# Patient Record
Sex: MALE | Race: BLACK OR AFRICAN AMERICAN | NOT HISPANIC OR LATINO | Employment: FULL TIME | URBAN - METROPOLITAN AREA
[De-identification: names, ages, dates, MRNs, and addresses within clinical notes are randomized per-mention and may not be internally consistent; named-entity substitution may affect disease eponyms.]

---

## 2023-06-30 ENCOUNTER — OFFICE VISIT (OUTPATIENT)
Dept: PRIMARY CARE | Facility: CLINIC | Age: 33
End: 2023-06-30
Payer: COMMERCIAL

## 2023-06-30 ENCOUNTER — LAB (OUTPATIENT)
Dept: LAB | Facility: LAB | Age: 33
End: 2023-06-30
Payer: COMMERCIAL

## 2023-06-30 VITALS
SYSTOLIC BLOOD PRESSURE: 124 MMHG | WEIGHT: 226 LBS | DIASTOLIC BLOOD PRESSURE: 80 MMHG | HEART RATE: 80 BPM | HEIGHT: 68 IN | BODY MASS INDEX: 34.25 KG/M2

## 2023-06-30 DIAGNOSIS — Z72.51 UNPROTECTED SEXUAL INTERCOURSE: ICD-10-CM

## 2023-06-30 DIAGNOSIS — L81.9 ATYPICAL PIGMENTED SKIN LESION: Primary | ICD-10-CM

## 2023-06-30 PROBLEM — A60.00 GENITAL HERPES: Status: ACTIVE | Noted: 2023-06-30

## 2023-06-30 PROBLEM — G47.33 OSA (OBSTRUCTIVE SLEEP APNEA): Status: ACTIVE | Noted: 2023-06-30

## 2023-06-30 PROBLEM — I86.1 LEFT VARICOCELE: Status: ACTIVE | Noted: 2023-06-30

## 2023-06-30 PROBLEM — F41.9 ANXIETY: Status: ACTIVE | Noted: 2023-06-30

## 2023-06-30 PROBLEM — N46.9 INFERTILITY MALE: Status: ACTIVE | Noted: 2023-06-30

## 2023-06-30 PROBLEM — J30.9 ALLERGIC RHINITIS: Status: ACTIVE | Noted: 2023-06-30

## 2023-06-30 LAB
HEPATITIS B VIRUS SURFACE AB (MIU/ML) IN SERUM: 496 MIU/ML
HEPATITIS C VIRUS AB PRESENCE IN SERUM: NONREACTIVE
HIV 1/ 2 AG/AB SCREEN: NONREACTIVE
SYPHILIS TOTAL AB: NONREACTIVE

## 2023-06-30 PROCEDURE — 1036F TOBACCO NON-USER: CPT | Performed by: NURSE PRACTITIONER

## 2023-06-30 PROCEDURE — 86803 HEPATITIS C AB TEST: CPT

## 2023-06-30 PROCEDURE — 86706 HEP B SURFACE ANTIBODY: CPT

## 2023-06-30 PROCEDURE — 36415 COLL VENOUS BLD VENIPUNCTURE: CPT

## 2023-06-30 PROCEDURE — 86780 TREPONEMA PALLIDUM: CPT

## 2023-06-30 PROCEDURE — 87389 HIV-1 AG W/HIV-1&-2 AB AG IA: CPT

## 2023-06-30 PROCEDURE — 99213 OFFICE O/P EST LOW 20 MIN: CPT | Performed by: NURSE PRACTITIONER

## 2023-06-30 NOTE — PATIENT INSTRUCTIONS
Thank you for seeing me today.  It was a pleasure to see you again!    #LESION/GROWTH ON PENIS  See dermatology for evaluation of bumps on penis     #UNPROTECTED SEX  Labs ordered    RTC FOR CPE

## 2023-06-30 NOTE — PROGRESS NOTES
"Russel Willis is a 32 y.o. male who presents today for STD TESTING     Chief Complaint   Patient presents with    Follow-up     Russel is today for STD testing. Pt reports he wants a \"physical\" with complete bloodwork however pt is scheduled for F/U with c/o issue.       Symptoms: BUMPS ON PENIS  Pt states 2 bumps just popped up about 2 weeks ago  They do not itch/hurt/change size    Symptom onset has been acute for a time period of 2 week(s).     Severity is described as NO SYMPTOMS     Course of her symptoms over time is acute.    Has taken: NOTHING      Pt was seen and dx'd with HSV 2 about 1.5 yrs ago and he states these bumps are not like those     Pt is currently sexually active with the same female partner     Review of Systems  All 13 systems were reviewed and are within normal limits except positive and pertinent negative responses which are noted below or in HPI.        Objective   Vitals:  /80   Pulse 80   Ht 1.727 m (5' 8\")   Wt 103 kg (226 lb)   BMI 34.36 kg/m²         Physical Exam  Genitourinary:            Assessment/Plan   Problem List Items Addressed This Visit    None  Visit Diagnoses       Atypical pigmented skin lesion    -  Primary    Relevant Orders    Referral to Dermatology    Unprotected sexual intercourse        Relevant Orders    Hepatitis B Surface Antibody    Hepatitis C Antibody    HIV 1/2 Antigen/Antibody Screen with Reflex to Confirmation    Syphilis Screen with Reflex            "

## 2023-12-27 ENCOUNTER — HOSPITAL ENCOUNTER (EMERGENCY)
Facility: HOSPITAL | Age: 33
Discharge: HOME | End: 2023-12-27
Payer: COMMERCIAL

## 2023-12-27 ENCOUNTER — APPOINTMENT (OUTPATIENT)
Dept: RADIOLOGY | Facility: HOSPITAL | Age: 33
End: 2023-12-27
Payer: COMMERCIAL

## 2023-12-27 VITALS
DIASTOLIC BLOOD PRESSURE: 98 MMHG | RESPIRATION RATE: 16 BRPM | WEIGHT: 216 LBS | SYSTOLIC BLOOD PRESSURE: 147 MMHG | HEIGHT: 68 IN | OXYGEN SATURATION: 99 % | BODY MASS INDEX: 32.74 KG/M2 | HEART RATE: 77 BPM | TEMPERATURE: 97.8 F

## 2023-12-27 DIAGNOSIS — S49.91XA ARM INJURY, RIGHT, INITIAL ENCOUNTER: Primary | ICD-10-CM

## 2023-12-27 PROCEDURE — 73080 X-RAY EXAM OF ELBOW: CPT | Mod: RIGHT SIDE | Performed by: RADIOLOGY

## 2023-12-27 PROCEDURE — 2500000004 HC RX 250 GENERAL PHARMACY W/ HCPCS (ALT 636 FOR OP/ED)

## 2023-12-27 PROCEDURE — 73090 X-RAY EXAM OF FOREARM: CPT | Mod: RIGHT SIDE | Performed by: RADIOLOGY

## 2023-12-27 PROCEDURE — 99284 EMERGENCY DEPT VISIT MOD MDM: CPT | Mod: 25

## 2023-12-27 PROCEDURE — 73090 X-RAY EXAM OF FOREARM: CPT | Mod: RT

## 2023-12-27 PROCEDURE — 73080 X-RAY EXAM OF ELBOW: CPT | Mod: RT

## 2023-12-27 PROCEDURE — 96372 THER/PROPH/DIAG INJ SC/IM: CPT

## 2023-12-27 RX ORDER — KETOROLAC TROMETHAMINE 30 MG/ML
30 INJECTION, SOLUTION INTRAMUSCULAR; INTRAVENOUS ONCE
Status: COMPLETED | OUTPATIENT
Start: 2023-12-27 | End: 2023-12-27

## 2023-12-27 RX ADMIN — KETOROLAC TROMETHAMINE 30 MG: 30 INJECTION, SOLUTION INTRAMUSCULAR; INTRAVENOUS at 12:19

## 2023-12-27 ASSESSMENT — COLUMBIA-SUICIDE SEVERITY RATING SCALE - C-SSRS
2. HAVE YOU ACTUALLY HAD ANY THOUGHTS OF KILLING YOURSELF?: NO
6. HAVE YOU EVER DONE ANYTHING, STARTED TO DO ANYTHING, OR PREPARED TO DO ANYTHING TO END YOUR LIFE?: NO
1. IN THE PAST MONTH, HAVE YOU WISHED YOU WERE DEAD OR WISHED YOU COULD GO TO SLEEP AND NOT WAKE UP?: NO

## 2023-12-27 ASSESSMENT — PAIN - FUNCTIONAL ASSESSMENT: PAIN_FUNCTIONAL_ASSESSMENT: 0-10

## 2023-12-27 ASSESSMENT — PAIN DESCRIPTION - ORIENTATION: ORIENTATION: RIGHT

## 2023-12-27 ASSESSMENT — PAIN DESCRIPTION - LOCATION: LOCATION: ARM

## 2023-12-27 ASSESSMENT — PAIN SCALES - GENERAL: PAINLEVEL_OUTOF10: 5 - MODERATE PAIN

## 2023-12-27 ASSESSMENT — PAIN DESCRIPTION - PAIN TYPE: TYPE: ACUTE PAIN

## 2023-12-27 NOTE — ED TRIAGE NOTES
Pt reports while boxing one week ago, he punched a heavy bag and felt the pain immediately. Reports no relief with otc medications, ice and or massage. Pt reports when he attempted to shut the car door, and made the pain significantly worse.

## 2023-12-27 NOTE — ED PROVIDER NOTES
Limitations to history: None  Independent Historians: Family  External Records Reviewed: HIE, OARRS, outpatient notes, inpatient notes, paper charts if needed    History of Present Illness:  Patient is a 33-year-old male presents to ED chief complaint of a right arm injury.  Patient reports that he was at the boxing gym when he hit the bag, and felt immediate pain of his right elbow, right forearm region.  Patient reports that the pain is like a dull ache ever since.  Patient denies any numbness and/or tingling.  Patient denies any shoulder pain.  Reports that he has no known medical history takes no known medications has no known allergies.  Patient is alert and oriented x 3 upon examination, in no apparent distress.     Denies HA, C/P, SOB, ABD pain, Nausea, Vomiting, Diarrhea, Weakness, Dizziness, Fever, Chills.    PMFSH:   As per HPI, otherwise nurses notes reviewed in EMR    Physical Exam:  Appearance: Alert, oriented x3, supine on exam table with head elevated, cooperative, in no acute distress. Well nourished & well hydrated.      Skin: Intact, dry skin, no lesions, rash, petechiae or purpura.     Eyes: PERRLA, EOMs intact, Conjunctiva pink with no redness or exudates. No scleral icterus.     Ears: Hearing grossly intact.      Nose: Nares patent, no epistaxis.     Mouth: Dentition without concerning abnormalities. no obstruction of posterior pharynx.     Neck: Supple, without meningismus. Trachea at midline.     Pulmonary: Clear bilaterally with good chest wall excursion. No rales, rhonchi or wheezing. No accessory muscle use or stridor. Talking in full sentences.     Cardiac: Normal S1, S2 without murmur, rub, gallop or extrasystole.     Abdomen: Soft, nontender to light and deep palpation to all quadrants, normoactive bowel sounds.  No palpable organomegaly.  No rebound or guarding.     Genitourinary: Physical exam deferred.     Musculoskeletal: Normal gait. Full range of motion to all extremities. Rest of  the exam reveals no pain on palpation, instability, or deformity. Pulses full and equal. No cyanosis or clubbing. capillary refill <2 seconds to all examined digits.     Neurological:  Cranial nerves II through XII are grossly intact, normal sensation, no weakness, no focal findings identified.      Psychiatric: Appropriate mood and affect.    Labs Reviewed - No data to display   XR forearm right 2 views   Final Result   1. No acute fracture or malalignment of the right elbow or the right   forearm.   2. Incidental small triceps insertional site enthesophyte on the   olecranon.        MACRO:   None.        Signed by: Claire Weber 12/27/2023 12:35 PM   Dictation workstation:   [id number]      XR elbow right 3+ views   Final Result   1. No acute fracture or malalignment of the right elbow or the right   forearm.   2. Incidental small triceps insertional site enthesophyte on the   olecranon.        MACRO:   None.        Signed by: Claire Weber 12/27/2023 12:35 PM   Dictation workstation:   [id number]                 Repeat Evaluation below    Summary:  Medical Decision Making:   Patient presented as described in HPI. Patient case including ROS, PE, and treatment and plan discussed with ED attending if attached as cosigner. Due to patients presentation orders completed include as documented.  Patient evaluated for complaints of right arm pain after a injury.  X-ray imaging revealed no acute fracture or malalignment of the right elbow or of the right forearm.  Incidental small triceps insertional site enthesophyte on the olecranon.  Patient was able to move extremity, reports it is a dull ache.  Patient was given dosage of Toradol while in ED for right upper extremity pain.  Patient will be referred to Dr. Tre Cheema.  Advised to follow-up within the next week to 2 weeks for further evaluation.  Patient offered a sling for comfort.  Patient aware to take Tylenol and or Motrin as needed for pain.  Patient  also aware to rest extremity, ice as needed, and refrain from any physical activity or boxing at this time.  Patient was advised to follow up with PCP or recommended provider in 2-3 days for another evaluation and exam. I advised patient/guardian to return or go to closest emergency room immediately if symptoms change, get worse, new symptoms develop prior to follow up. If there is no improvement in symptoms in the next 24 hours they are advised to return for further evaluation and exam. I also explained the plan and treatment course. Patient/guardian is in agreement with plan, treatment course, and follow up and states verbally that they will comply.    Tests/Medications/Escalations of Care considered but not given:    Patient care discussed with: N/A  Social Determinants affecting care: N/A    Final diagnosis and disposition as documented in impression    Homegoing. I discussed the differential; results and discharge plan with the patient and/or family/friend/caregiver if present.  I emphasized the importance of follow-up with the physician I referred them to in the timeframe recommended.  I explained reasons for the patient to return to the Emergency Department. They agreed that if they feel their condition is worsening or if they have any other concern they should call 911 immediately for further assistance. I gave the patient an opportunity to ask all questions they had and answered all of them accordingly. They understand return precautions and discharge instructions. The patient and/or family/friend/caregiver expressed understanding verbally and that they would comply.       Disposition:  Discharge       This note has been transcribed using voice recognition and may contain grammatical errors, misplaced words, incorrect words, incorrect phrases or other errors.     Batsheva Hale, APRN-CNP  12/27/23 [id number]

## 2024-01-15 ENCOUNTER — APPOINTMENT (OUTPATIENT)
Dept: ORTHOPEDIC SURGERY | Facility: CLINIC | Age: 34
End: 2024-01-15
Payer: COMMERCIAL

## 2024-08-09 ENCOUNTER — HOSPITAL ENCOUNTER (OUTPATIENT)
Facility: HOSPITAL | Age: 34
Setting detail: OBSERVATION
Discharge: HOME | End: 2024-08-09
Attending: INTERNAL MEDICINE | Admitting: INTERNAL MEDICINE
Payer: COMMERCIAL

## 2024-08-09 ENCOUNTER — APPOINTMENT (OUTPATIENT)
Dept: CARDIOLOGY | Facility: HOSPITAL | Age: 34
End: 2024-08-09
Payer: COMMERCIAL

## 2024-08-09 ENCOUNTER — APPOINTMENT (OUTPATIENT)
Dept: RADIOLOGY | Facility: HOSPITAL | Age: 34
End: 2024-08-09
Payer: COMMERCIAL

## 2024-08-09 DIAGNOSIS — K21.9 GASTROESOPHAGEAL REFLUX DISEASE WITHOUT ESOPHAGITIS: ICD-10-CM

## 2024-08-09 DIAGNOSIS — R10.9 ABDOMINAL PAIN, UNSPECIFIED ABDOMINAL LOCATION: ICD-10-CM

## 2024-08-09 DIAGNOSIS — H81.10 BENIGN PAROXYSMAL POSITIONAL VERTIGO, UNSPECIFIED LATERALITY: ICD-10-CM

## 2024-08-09 DIAGNOSIS — G43.009 MIGRAINE WITHOUT AURA AND WITHOUT STATUS MIGRAINOSUS, NOT INTRACTABLE: ICD-10-CM

## 2024-08-09 DIAGNOSIS — R42 VERTIGO: Primary | ICD-10-CM

## 2024-08-09 DIAGNOSIS — H81.23: ICD-10-CM

## 2024-08-09 LAB
ALBUMIN SERPL BCP-MCNC: 4.3 G/DL (ref 3.4–5)
ALP SERPL-CCNC: 50 U/L (ref 33–120)
ALT SERPL W P-5'-P-CCNC: 44 U/L (ref 10–52)
ANION GAP SERPL CALC-SCNC: 10 MMOL/L (ref 10–20)
APPEARANCE UR: CLEAR
AST SERPL W P-5'-P-CCNC: 22 U/L (ref 9–39)
BASOPHILS # BLD AUTO: 0.05 X10*3/UL (ref 0–0.1)
BASOPHILS NFR BLD AUTO: 0.7 %
BILIRUB SERPL-MCNC: 0.6 MG/DL (ref 0–1.2)
BILIRUB UR STRIP.AUTO-MCNC: NEGATIVE MG/DL
BUN SERPL-MCNC: 12 MG/DL (ref 6–23)
CALCIUM SERPL-MCNC: 9.6 MG/DL (ref 8.6–10.3)
CHLORIDE SERPL-SCNC: 102 MMOL/L (ref 98–107)
CO2 SERPL-SCNC: 32 MMOL/L (ref 21–32)
COLOR UR: ABNORMAL
CREAT SERPL-MCNC: 1.19 MG/DL (ref 0.5–1.3)
EGFRCR SERPLBLD CKD-EPI 2021: 84 ML/MIN/1.73M*2
EOSINOPHIL # BLD AUTO: 0.23 X10*3/UL (ref 0–0.7)
EOSINOPHIL NFR BLD AUTO: 3.4 %
ERYTHROCYTE [DISTWIDTH] IN BLOOD BY AUTOMATED COUNT: 11.7 % (ref 11.5–14.5)
GLUCOSE BLD MANUAL STRIP-MCNC: 101 MG/DL (ref 74–99)
GLUCOSE SERPL-MCNC: 103 MG/DL (ref 74–99)
GLUCOSE UR STRIP.AUTO-MCNC: NORMAL MG/DL
HCT VFR BLD AUTO: 42.5 % (ref 41–52)
HGB BLD-MCNC: 14.4 G/DL (ref 13.5–17.5)
IMM GRANULOCYTES # BLD AUTO: 0.03 X10*3/UL (ref 0–0.7)
IMM GRANULOCYTES NFR BLD AUTO: 0.4 % (ref 0–0.9)
KETONES UR STRIP.AUTO-MCNC: NEGATIVE MG/DL
LEUKOCYTE ESTERASE UR QL STRIP.AUTO: NEGATIVE
LYMPHOCYTES # BLD AUTO: 2.66 X10*3/UL (ref 1.2–4.8)
LYMPHOCYTES NFR BLD AUTO: 39.8 %
MAGNESIUM SERPL-MCNC: 1.76 MG/DL (ref 1.6–2.4)
MCH RBC QN AUTO: 30.5 PG (ref 26–34)
MCHC RBC AUTO-ENTMCNC: 33.9 G/DL (ref 32–36)
MCV RBC AUTO: 90 FL (ref 80–100)
MONOCYTES # BLD AUTO: 0.45 X10*3/UL (ref 0.1–1)
MONOCYTES NFR BLD AUTO: 6.7 %
MUCOUS THREADS #/AREA URNS AUTO: NORMAL /LPF
NEUTROPHILS # BLD AUTO: 3.26 X10*3/UL (ref 1.2–7.7)
NEUTROPHILS NFR BLD AUTO: 49 %
NITRITE UR QL STRIP.AUTO: NEGATIVE
NRBC BLD-RTO: 0 /100 WBCS (ref 0–0)
PH UR STRIP.AUTO: 6.5 [PH]
PLATELET # BLD AUTO: 250 X10*3/UL (ref 150–450)
POTASSIUM SERPL-SCNC: 3.6 MMOL/L (ref 3.5–5.3)
PROT SERPL-MCNC: 7.4 G/DL (ref 6.4–8.2)
PROT UR STRIP.AUTO-MCNC: NEGATIVE MG/DL
RBC # BLD AUTO: 4.72 X10*6/UL (ref 4.5–5.9)
RBC # UR STRIP.AUTO: ABNORMAL /UL
RBC #/AREA URNS AUTO: NORMAL /HPF
SODIUM SERPL-SCNC: 140 MMOL/L (ref 136–145)
SP GR UR STRIP.AUTO: 1.02
UROBILINOGEN UR STRIP.AUTO-MCNC: NORMAL MG/DL
WBC # BLD AUTO: 6.7 X10*3/UL (ref 4.4–11.3)
WBC #/AREA URNS AUTO: NORMAL /HPF

## 2024-08-09 PROCEDURE — 99285 EMERGENCY DEPT VISIT HI MDM: CPT | Mod: 25

## 2024-08-09 PROCEDURE — 82947 ASSAY GLUCOSE BLOOD QUANT: CPT

## 2024-08-09 PROCEDURE — 70450 CT HEAD/BRAIN W/O DYE: CPT | Mod: 59

## 2024-08-09 PROCEDURE — 85025 COMPLETE CBC W/AUTO DIFF WBC: CPT

## 2024-08-09 PROCEDURE — 2500000001 HC RX 250 WO HCPCS SELF ADMINISTERED DRUGS (ALT 637 FOR MEDICARE OP)

## 2024-08-09 PROCEDURE — 70450 CT HEAD/BRAIN W/O DYE: CPT | Performed by: RADIOLOGY

## 2024-08-09 PROCEDURE — 36415 COLL VENOUS BLD VENIPUNCTURE: CPT

## 2024-08-09 PROCEDURE — G0378 HOSPITAL OBSERVATION PER HR: HCPCS

## 2024-08-09 PROCEDURE — 96375 TX/PRO/DX INJ NEW DRUG ADDON: CPT

## 2024-08-09 PROCEDURE — 81001 URINALYSIS AUTO W/SCOPE: CPT

## 2024-08-09 PROCEDURE — 96361 HYDRATE IV INFUSION ADD-ON: CPT

## 2024-08-09 PROCEDURE — 2500000004 HC RX 250 GENERAL PHARMACY W/ HCPCS (ALT 636 FOR OP/ED)

## 2024-08-09 PROCEDURE — 83735 ASSAY OF MAGNESIUM: CPT

## 2024-08-09 PROCEDURE — 80053 COMPREHEN METABOLIC PANEL: CPT

## 2024-08-09 PROCEDURE — 93005 ELECTROCARDIOGRAM TRACING: CPT

## 2024-08-09 PROCEDURE — 96374 THER/PROPH/DIAG INJ IV PUSH: CPT

## 2024-08-09 RX ORDER — TALC
3 POWDER (GRAM) TOPICAL NIGHTLY PRN
Status: DISPENSED | OUTPATIENT
Start: 2024-08-09

## 2024-08-09 RX ORDER — ONDANSETRON HYDROCHLORIDE 2 MG/ML
4 INJECTION, SOLUTION INTRAVENOUS EVERY 8 HOURS PRN
Status: DISPENSED | OUTPATIENT
Start: 2024-08-09

## 2024-08-09 RX ORDER — POLYETHYLENE GLYCOL 3350 17 G/17G
17 POWDER, FOR SOLUTION ORAL DAILY PRN
Status: ACTIVE | OUTPATIENT
Start: 2024-08-09

## 2024-08-09 RX ORDER — HYDROXYZINE HYDROCHLORIDE 25 MG/1
25 TABLET, FILM COATED ORAL ONCE
Status: COMPLETED | OUTPATIENT
Start: 2024-08-09 | End: 2024-08-09

## 2024-08-09 RX ORDER — MECLIZINE HCL 12.5 MG 12.5 MG/1
25 TABLET ORAL 3 TIMES DAILY PRN
Status: DISCONTINUED | OUTPATIENT
Start: 2024-08-09 | End: 2024-08-11

## 2024-08-09 RX ORDER — FLUTICASONE PROPIONATE 50 MCG
2 SPRAY, SUSPENSION (ML) NASAL DAILY
Status: DISPENSED | OUTPATIENT
Start: 2024-08-10

## 2024-08-09 RX ORDER — DIAZEPAM 5 MG/ML
5 INJECTION, SOLUTION INTRAMUSCULAR; INTRAVENOUS ONCE
Status: COMPLETED | OUTPATIENT
Start: 2024-08-09 | End: 2024-08-09

## 2024-08-09 RX ORDER — PANTOPRAZOLE SODIUM 40 MG/1
40 TABLET, DELAYED RELEASE ORAL
Status: DISCONTINUED | OUTPATIENT
Start: 2024-08-10 | End: 2024-08-11

## 2024-08-09 RX ORDER — SODIUM CHLORIDE 9 MG/ML
100 INJECTION, SOLUTION INTRAVENOUS CONTINUOUS
Status: DISCONTINUED | OUTPATIENT
Start: 2024-08-09 | End: 2024-08-11

## 2024-08-09 RX ORDER — METOCLOPRAMIDE HYDROCHLORIDE 5 MG/ML
10 INJECTION INTRAMUSCULAR; INTRAVENOUS EVERY 6 HOURS PRN
Status: CANCELLED | OUTPATIENT
Start: 2024-08-09

## 2024-08-09 RX ORDER — KETOROLAC TROMETHAMINE 15 MG/ML
15 INJECTION, SOLUTION INTRAMUSCULAR; INTRAVENOUS EVERY 6 HOURS PRN
Status: ACTIVE | OUTPATIENT
Start: 2024-08-09 | End: 2024-08-14

## 2024-08-09 RX ORDER — CETIRIZINE HYDROCHLORIDE 10 MG/1
10 TABLET ORAL DAILY
Status: DISPENSED | OUTPATIENT
Start: 2024-08-10

## 2024-08-09 RX ORDER — ENOXAPARIN SODIUM 100 MG/ML
40 INJECTION SUBCUTANEOUS EVERY 24 HOURS
Status: DISPENSED | OUTPATIENT
Start: 2024-08-09

## 2024-08-09 RX ORDER — ONDANSETRON HYDROCHLORIDE 2 MG/ML
4 INJECTION, SOLUTION INTRAVENOUS ONCE
Status: COMPLETED | OUTPATIENT
Start: 2024-08-09 | End: 2024-08-09

## 2024-08-09 RX ORDER — METOCLOPRAMIDE HYDROCHLORIDE 5 MG/ML
10 INJECTION INTRAMUSCULAR; INTRAVENOUS EVERY 6 HOURS PRN
Status: DISCONTINUED | OUTPATIENT
Start: 2024-08-09 | End: 2024-08-11

## 2024-08-09 RX ORDER — METOCLOPRAMIDE 10 MG/1
10 TABLET ORAL EVERY 6 HOURS PRN
Status: CANCELLED | OUTPATIENT
Start: 2024-08-09

## 2024-08-09 RX ORDER — MECLIZINE HCL 12.5 MG 12.5 MG/1
25 TABLET ORAL ONCE
Status: COMPLETED | OUTPATIENT
Start: 2024-08-09 | End: 2024-08-09

## 2024-08-09 RX ORDER — MECLIZINE HCL 12.5 MG 12.5 MG/1
TABLET ORAL
Status: COMPLETED
Start: 2024-08-09 | End: 2024-08-09

## 2024-08-09 RX ADMIN — HYDROXYZINE HYDROCHLORIDE 25 MG: 25 TABLET ORAL at 16:36

## 2024-08-09 RX ADMIN — SODIUM CHLORIDE, POTASSIUM CHLORIDE, SODIUM LACTATE AND CALCIUM CHLORIDE 1000 ML: 600; 310; 30; 20 INJECTION, SOLUTION INTRAVENOUS at 14:45

## 2024-08-09 RX ADMIN — FLUTICASONE PROPIONATE 2 SPRAY: 50 SPRAY, METERED NASAL at 23:35

## 2024-08-09 RX ADMIN — METOCLOPRAMIDE HYDROCHLORIDE 10 MG: 5 INJECTION INTRAMUSCULAR; INTRAVENOUS at 23:18

## 2024-08-09 RX ADMIN — MECLIZINE HYDROCHLORIDE 25 MG: 12.5 TABLET ORAL at 22:55

## 2024-08-09 RX ADMIN — MECLIZINE HYDROCHLORIDE 25 MG: 12.5 TABLET ORAL at 14:41

## 2024-08-09 RX ADMIN — SODIUM CHLORIDE 100 ML/HR: 9 INJECTION, SOLUTION INTRAVENOUS at 23:13

## 2024-08-09 RX ADMIN — DIAZEPAM 5 MG: 5 INJECTION, SOLUTION INTRAMUSCULAR; INTRAVENOUS at 18:30

## 2024-08-09 RX ADMIN — ONDANSETRON 4 MG: 2 INJECTION INTRAMUSCULAR; INTRAVENOUS at 14:42

## 2024-08-09 RX ADMIN — CETIRIZINE HYDROCHLORIDE 10 MG: 10 TABLET, FILM COATED ORAL at 23:35

## 2024-08-09 SDOH — SOCIAL STABILITY: SOCIAL INSECURITY: DO YOU FEEL ANYONE HAS EXPLOITED OR TAKEN ADVANTAGE OF YOU FINANCIALLY OR OF YOUR PERSONAL PROPERTY?: NO

## 2024-08-09 SDOH — SOCIAL STABILITY: SOCIAL INSECURITY: ARE YOU OR HAVE YOU BEEN THREATENED OR ABUSED PHYSICALLY, EMOTIONALLY, OR SEXUALLY BY ANYONE?: NO

## 2024-08-09 SDOH — SOCIAL STABILITY: SOCIAL INSECURITY: DOES ANYONE TRY TO KEEP YOU FROM HAVING/CONTACTING OTHER FRIENDS OR DOING THINGS OUTSIDE YOUR HOME?: NO

## 2024-08-09 SDOH — SOCIAL STABILITY: SOCIAL INSECURITY: HAVE YOU HAD ANY THOUGHTS OF HARMING ANYONE ELSE?: NO

## 2024-08-09 SDOH — SOCIAL STABILITY: SOCIAL INSECURITY: ARE THERE ANY APPARENT SIGNS OF INJURIES/BEHAVIORS THAT COULD BE RELATED TO ABUSE/NEGLECT?: NO

## 2024-08-09 SDOH — SOCIAL STABILITY: SOCIAL INSECURITY: ABUSE: ADULT

## 2024-08-09 SDOH — SOCIAL STABILITY: SOCIAL INSECURITY: DO YOU FEEL UNSAFE GOING BACK TO THE PLACE WHERE YOU ARE LIVING?: NO

## 2024-08-09 SDOH — SOCIAL STABILITY: SOCIAL INSECURITY: HAVE YOU HAD THOUGHTS OF HARMING ANYONE ELSE?: NO

## 2024-08-09 SDOH — SOCIAL STABILITY: SOCIAL INSECURITY: HAS ANYONE EVER THREATENED TO HURT YOUR FAMILY OR YOUR PETS?: NO

## 2024-08-09 SDOH — SOCIAL STABILITY: SOCIAL INSECURITY: WERE YOU ABLE TO COMPLETE ALL THE BEHAVIORAL HEALTH SCREENINGS?: YES

## 2024-08-09 ASSESSMENT — COGNITIVE AND FUNCTIONAL STATUS - GENERAL
PATIENT BASELINE BEDBOUND: NO
DAILY ACTIVITIY SCORE: 24
MOBILITY SCORE: 24

## 2024-08-09 ASSESSMENT — PATIENT HEALTH QUESTIONNAIRE - PHQ9
SUM OF ALL RESPONSES TO PHQ9 QUESTIONS 1 & 2: 0
1. LITTLE INTEREST OR PLEASURE IN DOING THINGS: NOT AT ALL
2. FEELING DOWN, DEPRESSED OR HOPELESS: NOT AT ALL

## 2024-08-09 ASSESSMENT — LIFESTYLE VARIABLES
HOW OFTEN DO YOU HAVE A DRINK CONTAINING ALCOHOL: NEVER
HOW MANY STANDARD DRINKS CONTAINING ALCOHOL DO YOU HAVE ON A TYPICAL DAY: PATIENT DOES NOT DRINK
HOW OFTEN DO YOU HAVE 6 OR MORE DRINKS ON ONE OCCASION: NEVER
AUDIT-C TOTAL SCORE: 0
AUDIT-C TOTAL SCORE: 0
SKIP TO QUESTIONS 9-10: 1

## 2024-08-09 ASSESSMENT — ENCOUNTER SYMPTOMS
DIZZINESS: 1
HEMATOLOGIC/LYMPHATIC NEGATIVE: 1
CARDIOVASCULAR NEGATIVE: 1
CONSTITUTIONAL NEGATIVE: 1
EYES NEGATIVE: 1
MUSCULOSKELETAL NEGATIVE: 1
RESPIRATORY NEGATIVE: 1
PSYCHIATRIC NEGATIVE: 1
VOMITING: 1
ENDOCRINE NEGATIVE: 1
ALLERGIC/IMMUNOLOGIC NEGATIVE: 1
NAUSEA: 1

## 2024-08-09 ASSESSMENT — ACTIVITIES OF DAILY LIVING (ADL)
HEARING - LEFT EAR: FUNCTIONAL
GROOMING: INDEPENDENT
WALKS IN HOME: INDEPENDENT
LACK_OF_TRANSPORTATION: NO
DRESSING YOURSELF: INDEPENDENT
FEEDING YOURSELF: INDEPENDENT
HEARING - RIGHT EAR: FUNCTIONAL
TOILETING: INDEPENDENT
PATIENT'S MEMORY ADEQUATE TO SAFELY COMPLETE DAILY ACTIVITIES?: YES
BATHING: INDEPENDENT
ADEQUATE_TO_COMPLETE_ADL: YES
JUDGMENT_ADEQUATE_SAFELY_COMPLETE_DAILY_ACTIVITIES: YES

## 2024-08-09 ASSESSMENT — PAIN DESCRIPTION - FREQUENCY: FREQUENCY: CONSTANT/CONTINUOUS

## 2024-08-09 ASSESSMENT — PAIN - FUNCTIONAL ASSESSMENT
PAIN_FUNCTIONAL_ASSESSMENT: 0-10
PAIN_FUNCTIONAL_ASSESSMENT: 0-10

## 2024-08-09 ASSESSMENT — PAIN DESCRIPTION - LOCATION: LOCATION: HEAD

## 2024-08-09 ASSESSMENT — PAIN SCALES - GENERAL
PAINLEVEL_OUTOF10: 4
PAINLEVEL_OUTOF10: 0 - NO PAIN
PAINLEVEL_OUTOF10: 0 - NO PAIN

## 2024-08-09 ASSESSMENT — PAIN DESCRIPTION - PAIN TYPE: TYPE: ACUTE PAIN

## 2024-08-09 ASSESSMENT — PAIN DESCRIPTION - PROGRESSION: CLINICAL_PROGRESSION: GRADUALLY WORSENING

## 2024-08-09 ASSESSMENT — PAIN DESCRIPTION - DESCRIPTORS: DESCRIPTORS: ACHING

## 2024-08-09 ASSESSMENT — PAIN DESCRIPTION - ONSET: ONSET: PROGRESSIVE

## 2024-08-09 NOTE — ED PROVIDER NOTES
HPI   Chief Complaint   Patient presents with    Dizziness    Vomiting    Headache       Patient is a 33-year-old male with significant PMH of GERD, anxiety presents to the ED with cc of dizziness x 3 days.  Patient states he was at a concert 4 days ago and when he woke up he had vertigo symptoms.  Patient states vertigo is intermittent and describes as a room spinning off-balance sensation.  Patient states he has never been diagnosed with vertigo in the past.  Patient states vertigo is worse with head movement or ambulating.  Patient denies any vision changes.  Patient does have headache in bilateral temples.  Patient does have nausea and vomiting endorses 2 episodes of emesis today and 1 episode yesterday.  Patient has been able to tolerate some p.o. intake.  Patient denies any chest pain shortness of breath abdominal pain diarrhea constipation.  Patient does endorse tingling throughout his entire body.  Patient denies any tobacco alcohol or street drug abuse.              Patient History   Past Medical History:   Diagnosis Date    Anesthesia of skin 06/25/2019    Numbness and tingling    Disorder of penis, unspecified 03/02/2021    Penile lesion    Dysphagia, oropharyngeal phase 09/23/2019    Dysphagia, oropharyngeal phase    Gastro-esophageal reflux disease without esophagitis 11/11/2019    GERD without esophagitis    Localized swelling, mass and lump, neck 08/27/2019    Mass of right side of neck    Other specified anxiety disorders 06/19/2019    Situational anxiety    Otitis media, unspecified, left ear 06/18/2020    Acute left otitis media    Personal history of other diseases of the digestive system 01/12/2021    History of gastroesophageal reflux (GERD)    Personal history of other diseases of the nervous system and sense organs 06/18/2020    History of otitis media    Personal history of other diseases of the nervous system and sense organs 10/10/2019    History of sleep apnea    Personal history of other  diseases of the respiratory system 06/04/2015    History of asthma    Personal history of other diseases of the respiratory system 08/27/2019    History of acute bronchitis    Personal history of other infectious and parasitic diseases 10/16/2019    History of Helicobacter pylori infection    Personal history of other specified conditions 11/09/2020    History of nasal congestion    Personal history of other specified conditions 09/23/2019    History of dysphagia    Personal history of other specified conditions 11/09/2020    History of nasal congestion    Personal history of other specified conditions 06/19/2019    History of shortness of breath    Personal history of other specified conditions 11/11/2019    History of palpitations    Personal history of other specified conditions 09/23/2019    History of chest pain    Shortness of breath 06/17/2019    Exertional shortness of breath    Unspecified otitis externa, left ear 06/18/2020    Left otitis externa     History reviewed. No pertinent surgical history.  Family History   Problem Relation Name Age of Onset    Osteoarthritis Mother      Other (t2dm) Father       Social History     Tobacco Use    Smoking status: Never     Passive exposure: Current    Smokeless tobacco: Current   Vaping Use    Vaping status: Every Day    Substances: Nicotine, Flavoring   Substance Use Topics    Alcohol use: Yes     Comment: social    Drug use: Never       Physical Exam   ED Triage Vitals [08/09/24 1423]   Temperature Heart Rate Respirations BP   36.8 °C (98.2 °F) 67 18 142/86      Pulse Ox Temp Source Heart Rate Source Patient Position   97 % Oral -- --      BP Location FiO2 (%)     -- --       Physical Exam  HENT:      Head: Normocephalic.   Eyes:      Extraocular Movements: Extraocular movements intact.      Pupils: Pupils are equal, round, and reactive to light.   Cardiovascular:      Rate and Rhythm: Normal rate and regular rhythm.      Heart sounds: Normal heart sounds.    Pulmonary:      Effort: Pulmonary effort is normal.      Breath sounds: No wheezing, rhonchi or rales.   Abdominal:      Palpations: Abdomen is soft.      Tenderness: There is no abdominal tenderness. There is no guarding.   Musculoskeletal:         General: Normal range of motion.      Cervical back: Normal range of motion.   Neurological:      Mental Status: He is alert and oriented to person, place, and time. Mental status is at baseline.      GCS: GCS eye subscore is 4. GCS verbal subscore is 5. GCS motor subscore is 6.      Cranial Nerves: No dysarthria or facial asymmetry.      Sensory: No sensory deficit.      Motor: No weakness.      Gait: Gait normal.   Psychiatric:         Mood and Affect: Mood normal.           ED Course & MDM   ED Course as of 08/09/24 1946   Fri Aug 09, 2024   1455 EKG interpretation performed at 1490 normal sinus rhythm normal axis no acute signs of ischemia ventricular rate 66 bpm []      ED Course User Index  [] Ely Fung PA-C         Diagnoses as of 08/09/24 1946   Vertigo                 No data recorded     Hansa Coma Scale Score: 15 (08/09/24 1425 : Buck Bruce, EMT)                           Medical Decision Making  Medical Decision Making:  Patient presented as described in HPI. Patient case including ROS, PE, and treatment and plan discussed with ED attending if attached as cosigner. Due to patients presentation orders completed include as documented.  Patient presents to the ED with cc of dizziness x 3 days.  Patient states 4 days ago he was at a concert and has had vertigo symptoms since.  Patient states it is intermittent and worse with movement.  Patient does have some vomiting.  Patient is nontoxic-appearing abdomen is soft and nontender no neurofocal deficits lung sounds are clear bilaterally PERRLA EOMI. patient fluids Zofran and meclizine for symptoms.  Pending labs.  Labs are unremarkable.  Imaging is unremarkable.  Patient was given Atarax with  no relief in symptoms.  Patient given Valium again no relief in symptoms.  Patient offered admission which he would prefer due to continued symptoms.  I spoke with the hospitalist who accepts the patient.  Patient remained stable and admitted.        Patient care discussed with: N/A  Social Determinants affecting care: N/A    Final diagnosis and disposition as below.  See CI    Hospitalize. I discussed the differential; results and admit plan with the patient and/or family/friend/caregiver if present.  I emphasized the importance of hospitalization need for re-evaluation/continued monitoring/interventions.. They agreed that if they feel their condition is worsening or if they have any other concern they should alert staff immediately for further assistance. I gave the patient an opportunity to ask all questions they had and answered all of them accordingly. The patient and/or family/friend/caregiver expressed understanding verbally and that they would comply.       Disposition:  admit    Hospitalize. Discussed findings and treatment done here in ED with admitting physician. It would be a risk to discharge the patient in their condition due to possibility of deterioration in their condition and the need for urgent interventions.    This note has been transcribed using voice recognition and may contain grammatical errors, misplaced words, incorrect words, incorrect phrases or other errors.        CT head wo IV contrast   Final Result   No acute intracranial process.                  MACRO:   None.        Signed by: Mik Maya 8/9/2024 4:24 PM   Dictation workstation:   [id number]         Labs Reviewed   COMPREHENSIVE METABOLIC PANEL - Abnormal       Result Value    Glucose 103 (*)     Sodium 140      Potassium 3.6      Chloride 102      Bicarbonate 32      Anion Gap 10      Urea Nitrogen 12      Creatinine 1.19      eGFR 84      Calcium 9.6      Albumin 4.3      Alkaline Phosphatase 50      Total Protein 7.4       AST 22      Bilirubin, Total 0.6      ALT 44     URINALYSIS WITH REFLEX CULTURE AND MICROSCOPIC - Abnormal    Color, Urine Light-Yellow      Appearance, Urine Clear      Specific Gravity, Urine 1.022      pH, Urine 6.5      Protein, Urine NEGATIVE      Glucose, Urine Normal      Blood, Urine 0.03 (TRACE) (*)     Ketones, Urine NEGATIVE      Bilirubin, Urine NEGATIVE      Urobilinogen, Urine Normal      Nitrite, Urine NEGATIVE      Leukocyte Esterase, Urine NEGATIVE     POCT GLUCOSE - Abnormal    POCT Glucose 101 (*)    MAGNESIUM - Normal    Magnesium 1.76     CBC WITH AUTO DIFFERENTIAL    WBC 6.7      nRBC 0.0      RBC 4.72      Hemoglobin 14.4      Hematocrit 42.5      MCV 90      MCH 30.5      MCHC 33.9      RDW 11.7      Platelets 250      Neutrophils % 49.0      Immature Granulocytes %, Automated 0.4      Lymphocytes % 39.8      Monocytes % 6.7      Eosinophils % 3.4      Basophils % 0.7      Neutrophils Absolute 3.26      Immature Granulocytes Absolute, Automated 0.03      Lymphocytes Absolute 2.66      Monocytes Absolute 0.45      Eosinophils Absolute 0.23      Basophils Absolute 0.05     URINALYSIS WITH REFLEX CULTURE AND MICROSCOPIC    Narrative:     The following orders were created for panel order Urinalysis with Reflex Culture and Microscopic.  Procedure                               Abnormality         Status                     ---------                               -----------         ------                     Urinalysis with Reflex C...[[id number]]  Abnormal            Final result               Extra Urine Gray Tube[[id number]]                            In process                   Please view results for these tests on the individual orders.   EXTRA URINE GRAY TUBE   POCT GLUCOSE METER   URINALYSIS MICROSCOPIC WITH REFLEX CULTURE    WBC, Urine 1-5      RBC, Urine 1-2      Mucus, Urine FEW        Procedure  Procedures     Ely Fung PA-C  08/09/24 1947

## 2024-08-09 NOTE — LETTER
August 10, 2024     Patient: Russel Willis   YOB: 1990   Date of Visit: 8/9/2024       To Whom It May Concern:    Russel Willis was admitted on 8/9/2024 and discharged on 8/10/2024 . Please excuse Russel for his absence from work. He may return to work on Monday, 8/12/2024 with no restrictions.    If you have any questions or concerns, please don't hesitate to call.         Sincerely,         VAL King-ANGIE  Hospitalist        CC: No Recipients

## 2024-08-09 NOTE — LETTER
August 13, 2024     Patient: Russel Willis   YOB: 1990   Date of Visit: 8/9/2024       To Whom It May Concern:    Russel Willis was admitted under my care from 8/9/24 through 8/13/24. He may return to work on 8/15/24.  If you have any questions or concerns, please don't hesitate to call.     Sincerely,     Neeta Cardona, VAL-CNP  08/13/24  4:08 PM

## 2024-08-10 ENCOUNTER — APPOINTMENT (OUTPATIENT)
Dept: RADIOLOGY | Facility: HOSPITAL | Age: 34
End: 2024-08-10
Payer: COMMERCIAL

## 2024-08-10 PROBLEM — J01.90 ACUTE SINUSITIS: Status: ACTIVE | Noted: 2024-08-10

## 2024-08-10 PROBLEM — J20.9 ACUTE BRONCHITIS: Status: ACTIVE | Noted: 2024-08-10

## 2024-08-10 LAB
AMYLASE SERPL-CCNC: 48 U/L (ref 29–103)
ANION GAP SERPL CALC-SCNC: 10 MMOL/L (ref 10–20)
BUN SERPL-MCNC: 12 MG/DL (ref 6–23)
CALCIUM SERPL-MCNC: 9 MG/DL (ref 8.6–10.3)
CHLORIDE SERPL-SCNC: 102 MMOL/L (ref 98–107)
CO2 SERPL-SCNC: 31 MMOL/L (ref 21–32)
CREAT SERPL-MCNC: 1.03 MG/DL (ref 0.5–1.3)
EGFRCR SERPLBLD CKD-EPI 2021: >90 ML/MIN/1.73M*2
ERYTHROCYTE [DISTWIDTH] IN BLOOD BY AUTOMATED COUNT: 11.8 % (ref 11.5–14.5)
GLUCOSE SERPL-MCNC: 104 MG/DL (ref 74–99)
HCT VFR BLD AUTO: 40.8 % (ref 41–52)
HGB BLD-MCNC: 14 G/DL (ref 13.5–17.5)
HOLD SPECIMEN: NORMAL
LIPASE SERPL-CCNC: 14 U/L (ref 9–82)
MCH RBC QN AUTO: 30.9 PG (ref 26–34)
MCHC RBC AUTO-ENTMCNC: 34.3 G/DL (ref 32–36)
MCV RBC AUTO: 90 FL (ref 80–100)
NRBC BLD-RTO: 0 /100 WBCS (ref 0–0)
PLATELET # BLD AUTO: 238 X10*3/UL (ref 150–450)
POTASSIUM SERPL-SCNC: 4.1 MMOL/L (ref 3.5–5.3)
RBC # BLD AUTO: 4.53 X10*6/UL (ref 4.5–5.9)
SODIUM SERPL-SCNC: 139 MMOL/L (ref 136–145)
WBC # BLD AUTO: 8.1 X10*3/UL (ref 4.4–11.3)

## 2024-08-10 PROCEDURE — 80048 BASIC METABOLIC PNL TOTAL CA: CPT

## 2024-08-10 PROCEDURE — 99222 1ST HOSP IP/OBS MODERATE 55: CPT | Performed by: NURSE PRACTITIONER

## 2024-08-10 PROCEDURE — 83690 ASSAY OF LIPASE: CPT | Performed by: NURSE PRACTITIONER

## 2024-08-10 PROCEDURE — 74177 CT ABD & PELVIS W/CONTRAST: CPT | Performed by: RADIOLOGY

## 2024-08-10 PROCEDURE — 82150 ASSAY OF AMYLASE: CPT | Performed by: NURSE PRACTITIONER

## 2024-08-10 PROCEDURE — 2500000001 HC RX 250 WO HCPCS SELF ADMINISTERED DRUGS (ALT 637 FOR MEDICARE OP): Performed by: NURSE PRACTITIONER

## 2024-08-10 PROCEDURE — 94760 N-INVAS EAR/PLS OXIMETRY 1: CPT

## 2024-08-10 PROCEDURE — 2500000002 HC RX 250 W HCPCS SELF ADMINISTERED DRUGS (ALT 637 FOR MEDICARE OP, ALT 636 FOR OP/ED)

## 2024-08-10 PROCEDURE — G0378 HOSPITAL OBSERVATION PER HR: HCPCS

## 2024-08-10 PROCEDURE — 2500000001 HC RX 250 WO HCPCS SELF ADMINISTERED DRUGS (ALT 637 FOR MEDICARE OP)

## 2024-08-10 PROCEDURE — 96372 THER/PROPH/DIAG INJ SC/IM: CPT

## 2024-08-10 PROCEDURE — 74177 CT ABD & PELVIS W/CONTRAST: CPT

## 2024-08-10 PROCEDURE — 2500000004 HC RX 250 GENERAL PHARMACY W/ HCPCS (ALT 636 FOR OP/ED)

## 2024-08-10 PROCEDURE — 96376 TX/PRO/DX INJ SAME DRUG ADON: CPT

## 2024-08-10 PROCEDURE — 2550000001 HC RX 255 CONTRASTS: Performed by: INTERNAL MEDICINE

## 2024-08-10 PROCEDURE — 85027 COMPLETE CBC AUTOMATED: CPT

## 2024-08-10 PROCEDURE — 36415 COLL VENOUS BLD VENIPUNCTURE: CPT

## 2024-08-10 RX ORDER — IBUPROFEN 400 MG/1
400 TABLET ORAL ONCE
Status: COMPLETED | OUTPATIENT
Start: 2024-08-10 | End: 2024-08-10

## 2024-08-10 RX ORDER — MECLIZINE HYDROCHLORIDE 25 MG/1
25 TABLET ORAL 3 TIMES DAILY PRN
Qty: 42 TABLET | Refills: 0 | Status: SHIPPED | OUTPATIENT
Start: 2024-08-10 | End: 2024-08-24

## 2024-08-10 RX ORDER — ALUMINUM HYDROXIDE, MAGNESIUM HYDROXIDE, AND SIMETHICONE 1200; 120; 1200 MG/30ML; MG/30ML; MG/30ML
10 SUSPENSION ORAL 4 TIMES DAILY PRN
Status: ACTIVE | OUTPATIENT
Start: 2024-08-10

## 2024-08-10 RX ORDER — CALCIUM CARBONATE 200(500)MG
500 TABLET,CHEWABLE ORAL 4 TIMES DAILY PRN
Status: DISPENSED | OUTPATIENT
Start: 2024-08-10

## 2024-08-10 RX ORDER — SODIUM CHLORIDE 9 MG/ML
10 INJECTION, SOLUTION INTRAVENOUS CONTINUOUS PRN
Status: ACTIVE | OUTPATIENT
Start: 2024-08-10

## 2024-08-10 RX ORDER — BUTALBITAL, ACETAMINOPHEN AND CAFFEINE 50; 325; 40 MG/1; MG/1; MG/1
1 TABLET ORAL EVERY 4 HOURS PRN
Status: DISCONTINUED | OUTPATIENT
Start: 2024-08-10 | End: 2024-08-11

## 2024-08-10 RX ADMIN — BUTALBITAL, ACETAMINOPHEN, AND CAFFEINE 1 TABLET: 325; 50; 40 TABLET ORAL at 09:12

## 2024-08-10 RX ADMIN — BUTALBITAL, ACETAMINOPHEN, AND CAFFEINE 1 TABLET: 325; 50; 40 TABLET ORAL at 21:46

## 2024-08-10 RX ADMIN — IOHEXOL 75 ML: 350 INJECTION, SOLUTION INTRAVENOUS at 14:46

## 2024-08-10 RX ADMIN — IBUPROFEN 400 MG: 400 TABLET ORAL at 17:52

## 2024-08-10 RX ADMIN — ONDANSETRON 4 MG: 2 INJECTION INTRAMUSCULAR; INTRAVENOUS at 11:32

## 2024-08-10 RX ADMIN — CALCIUM CARBONATE (ANTACID) CHEW TAB 500 MG 500 MG: 500 CHEW TAB at 01:19

## 2024-08-10 RX ADMIN — PANTOPRAZOLE SODIUM 40 MG: 40 TABLET, DELAYED RELEASE ORAL at 06:09

## 2024-08-10 RX ADMIN — Medication 3 MG: at 21:46

## 2024-08-10 RX ADMIN — ONDANSETRON 4 MG: 2 INJECTION INTRAMUSCULAR; INTRAVENOUS at 07:00

## 2024-08-10 RX ADMIN — ENOXAPARIN SODIUM 40 MG: 40 INJECTION SUBCUTANEOUS at 23:03

## 2024-08-10 ASSESSMENT — PAIN - FUNCTIONAL ASSESSMENT
PAIN_FUNCTIONAL_ASSESSMENT: WONG-BAKER FACES
PAIN_FUNCTIONAL_ASSESSMENT: 0-10

## 2024-08-10 ASSESSMENT — COGNITIVE AND FUNCTIONAL STATUS - GENERAL
DAILY ACTIVITIY SCORE: 24
MOBILITY SCORE: 24

## 2024-08-10 ASSESSMENT — PAIN SCALES - GENERAL
PAINLEVEL_OUTOF10: 0 - NO PAIN
PAINLEVEL_OUTOF10: 0 - NO PAIN
PAINLEVEL_OUTOF10: 6
PAINLEVEL_OUTOF10: 3
PAINLEVEL_OUTOF10: 0 - NO PAIN
PAINLEVEL_OUTOF10: 0 - NO PAIN

## 2024-08-10 ASSESSMENT — PAIN SCALES - WONG BAKER: WONGBAKER_NUMERICALRESPONSE: NO HURT

## 2024-08-10 ASSESSMENT — PAIN DESCRIPTION - DESCRIPTORS: DESCRIPTORS: ACHING;DISCOMFORT

## 2024-08-10 NOTE — DISCHARGE SUMMARY
Discharge Diagnosis  Vertigo  Migraine headache without aura  BPPV    Issues Requiring Follow-Up      Discharge Meds     Your medication list        START taking these medications        Instructions Last Dose Given Next Dose Due   meclizine 25 mg tablet  Commonly known as: Antivert      Take 1 tablet (25 mg) by mouth 3 times a day as needed for dizziness for up to 14 days.                 Where to Get Your Medications        These medications were sent to [id number] - [place], [place] - [address]  [address] [place] [address]      Phone: [phone]   meclizine 25 mg tablet         Test Results Pending At Discharge  Pending Labs       No current pending labs.         33 y.o. male presenting with dizziness x 3 days. Patient states he was at a concert 4 days ago and when he woke up he had vertigo symptoms. Patient states vertigo is intermittent and describes as a room spinning off-balance sensation. Patient states he has never been diagnosed with vertigo in the past. Patient states vertigo is worse with head movement or ambulating. Patient denies any vision changes. Patient does have headache in bilateral temples. Patient does have nausea and vomiting endorses 2 episodes of emesis today and 1 episode yesterday. Patient has been able to tolerate some p.o. intake. Patient denies any chest pain shortness of breath abdominal pain diarrhea constipation. Patient does endorse tingling throughout his entire body. Patient denies any tobacco alcohol or street drug abuse.  Patient currently resting in bed, does endorse continued vertigo worse with movement.  Discussed treatment plan, patient states understanding, and agrees with plan.     Hospital Course    Vertigo  Migraine without aura  BPPV  -CT Brain: No acute intracranial process.  -Valium 5 mg IV, Atarax 25 mg PO, Antivert 25 mg PO, Zofran 4 mg IV, and LR 1,000 ml IV given in ED with no effect.  -Reglan, Fiorcet 50 -325 mg q4h, prn  Antivert, Toradol prn.  -Eply maneuver done by Dr. Carnes with some improvement  -outpatient vestibular therapy     Allergic rhinitis  -Zyrtec 10 mg PO Daily   -Flonase Nasal Daily      GERD  -Pantoprazole 40 mg PO Daily         DVT Prophylaxis:   - enoxaparin       Code Status: Full Code    Disposition: Patient was stable to be discharged to home. He will follow up with PT for vestibular therapy. He was discharged on meclizine. He will follow up with his PCP.    Total cumulative time spent in preparation of this discharge including documentation review, coordination of care with the medical team including PT/SW/care coordinators and treating consultants, discussion with patient and pertinent family members and finalization of prescriptions, follow-up appointments, and this discharge summary was approximately 45 minutes.     Seen with and case discussed with Dr. Tanner MD     Pertinent Physical Exam At Time of Discharge  Physical Exam  Constitutional:       Appearance: He is obese.   HENT:      Head: Normocephalic and atraumatic.      Nose: Nose normal.      Mouth/Throat:      Mouth: Mucous membranes are moist.   Eyes:      Extraocular Movements: Extraocular movements intact.      Pupils: Pupils are equal, round, and reactive to light.   Cardiovascular:      Rate and Rhythm: Normal rate and regular rhythm.      Pulses: Normal pulses.      Heart sounds: Normal heart sounds.   Pulmonary:      Effort: Pulmonary effort is normal.      Breath sounds: Normal breath sounds.   Abdominal:      General: Bowel sounds are normal.      Palpations: Abdomen is soft.   Musculoskeletal:         General: Normal range of motion.      Cervical back: Normal range of motion.   Skin:     General: Skin is warm and dry.      Capillary Refill: Capillary refill takes less than 2 seconds.   Neurological:      Mental Status: He is alert and oriented to person, place, and time. Mental status is at baseline.   Psychiatric:         Mood and  Affect: Mood normal.         Behavior: Behavior normal.   Outpatient Follow-Up  No future appointments.      Katelynn Rose, APRN-CNP

## 2024-08-10 NOTE — PROGRESS NOTES
Russel Willis is a 33 y.o. male on day 0 of admission presenting with Vertigo.      Subjective   Patient assessed at bedside; lying in bed. He complained of a headache and dizziness. He vomited after eating breakfast. He denies chest pain fever, chills.       Objective     Last Recorded Vitals  /71 (BP Location: Left arm, Patient Position: Sitting)   Pulse 72   Temp 36 °C (96.8 °F) (Temporal)   Resp 16   Wt 110 kg (241 lb 10 oz)   SpO2 96%   Intake/Output last 3 Shifts:    Intake/Output Summary (Last 24 hours) at 8/10/2024 1401  Last data filed at 8/10/2024 1300  Gross per 24 hour   Intake 3270.34 ml   Output 1000 ml   Net 2270.34 ml       Admission Weight  Weight: 107 kg (235 lb) (08/09/24 1423)    Daily Weight  08/10/24 : 110 kg (241 lb 10 oz)    Image Results      Physical Exam  Vitals reviewed.   Constitutional:       Appearance: Normal appearance. He is obese.   HENT:      Head: Normocephalic and atraumatic.      Right Ear: External ear normal.      Left Ear: External ear normal.      Nose: Nose normal.      Mouth/Throat:      Mouth: Mucous membranes are moist.      Pharynx: Oropharynx is clear.   Eyes:      Conjunctiva/sclera: Conjunctivae normal.      Pupils: Pupils are equal, round, and reactive to light.   Cardiovascular:      Rate and Rhythm: Normal rate and regular rhythm.      Pulses: Normal pulses.      Heart sounds: Normal heart sounds.   Pulmonary:      Effort: Pulmonary effort is normal.      Breath sounds: Normal breath sounds.   Abdominal:      General: Bowel sounds are normal.      Palpations: Abdomen is soft.   Musculoskeletal:         General: Normal range of motion.      Cervical back: Normal range of motion and neck supple.   Skin:     General: Skin is warm and dry.   Neurological:      General: No focal deficit present.      Mental Status: He is alert and oriented to person, place, and time.   Psychiatric:         Mood and Affect: Mood normal.         Behavior: Behavior  normal.         Thought Content: Thought content normal.         Judgment: Judgment normal.         Relevant Results    Scheduled medications  cetirizine, 10 mg, oral, Daily  enoxaparin, 40 mg, subcutaneous, q24h  fluticasone, 2 spray, Each Nostril, Daily  pantoprazole, 40 mg, oral, Daily before breakfast      Continuous medications  sodium chloride 0.9%, 100 mL/hr, Last Rate: Stopped (08/10/24 0900)  sodium chloride 0.9%, 10 mL/hr      PRN medications  PRN medications: acetaminophen-caffeine, alum-mag hydroxide-simeth, butalbital-acetaminophen-caff, calcium carbonate, ketorolac, meclizine, melatonin, metoclopramide, ondansetron, polyethylene glycol, sodium chloride 0.9%    Results for orders placed or performed during the hospital encounter of 08/09/24 (from the past 24 hour(s))   POCT GLUCOSE   Result Value Ref Range    POCT Glucose 101 (H) 74 - 99 mg/dL   CBC and Auto Differential   Result Value Ref Range    WBC 6.7 4.4 - 11.3 x10*3/uL    nRBC 0.0 0.0 - 0.0 /100 WBCs    RBC 4.72 4.50 - 5.90 x10*6/uL    Hemoglobin 14.4 13.5 - 17.5 g/dL    Hematocrit 42.5 41.0 - 52.0 %    MCV 90 80 - 100 fL    MCH 30.5 26.0 - 34.0 pg    MCHC 33.9 32.0 - 36.0 g/dL    RDW 11.7 11.5 - 14.5 %    Platelets 250 150 - 450 x10*3/uL    Neutrophils % 49.0 40.0 - 80.0 %    Immature Granulocytes %, Automated 0.4 0.0 - 0.9 %    Lymphocytes % 39.8 13.0 - 44.0 %    Monocytes % 6.7 2.0 - 10.0 %    Eosinophils % 3.4 0.0 - 6.0 %    Basophils % 0.7 0.0 - 2.0 %    Neutrophils Absolute 3.26 1.20 - 7.70 x10*3/uL    Immature Granulocytes Absolute, Automated 0.03 0.00 - 0.70 x10*3/uL    Lymphocytes Absolute 2.66 1.20 - 4.80 x10*3/uL    Monocytes Absolute 0.45 0.10 - 1.00 x10*3/uL    Eosinophils Absolute 0.23 0.00 - 0.70 x10*3/uL    Basophils Absolute 0.05 0.00 - 0.10 x10*3/uL   Comprehensive metabolic panel   Result Value Ref Range    Glucose 103 (H) 74 - 99 mg/dL    Sodium 140 136 - 145 mmol/L    Potassium 3.6 3.5 - 5.3 mmol/L    Chloride 102 98 - 107  mmol/L    Bicarbonate 32 21 - 32 mmol/L    Anion Gap 10 10 - 20 mmol/L    Urea Nitrogen 12 6 - 23 mg/dL    Creatinine 1.19 0.50 - 1.30 mg/dL    eGFR 84 >60 mL/min/1.73m*2    Calcium 9.6 8.6 - 10.3 mg/dL    Albumin 4.3 3.4 - 5.0 g/dL    Alkaline Phosphatase 50 33 - 120 U/L    Total Protein 7.4 6.4 - 8.2 g/dL    AST 22 9 - 39 U/L    Bilirubin, Total 0.6 0.0 - 1.2 mg/dL    ALT 44 10 - 52 U/L   Magnesium   Result Value Ref Range    Magnesium 1.76 1.60 - 2.40 mg/dL   Urinalysis with Reflex Culture and Microscopic   Result Value Ref Range    Color, Urine Light-Yellow Light-Yellow, Yellow, Dark-Yellow    Appearance, Urine Clear Clear    Specific Gravity, Urine 1.022 1.005 - 1.035    pH, Urine 6.5 5.0, 5.5, 6.0, 6.5, 7.0, 7.5, 8.0    Protein, Urine NEGATIVE NEGATIVE, 10 (TRACE), 20 (TRACE) mg/dL    Glucose, Urine Normal Normal mg/dL    Blood, Urine 0.03 (TRACE) (A) NEGATIVE    Ketones, Urine NEGATIVE NEGATIVE mg/dL    Bilirubin, Urine NEGATIVE NEGATIVE    Urobilinogen, Urine Normal Normal mg/dL    Nitrite, Urine NEGATIVE NEGATIVE    Leukocyte Esterase, Urine NEGATIVE NEGATIVE   Extra Urine Gray Tube   Result Value Ref Range    Extra Tube Hold for add-ons.    Urinalysis Microscopic   Result Value Ref Range    WBC, Urine 1-5 1-5, NONE /HPF    RBC, Urine 1-2 NONE, 1-2, 3-5 /HPF    Mucus, Urine FEW Reference range not established. /LPF   CBC   Result Value Ref Range    WBC 8.1 4.4 - 11.3 x10*3/uL    nRBC 0.0 0.0 - 0.0 /100 WBCs    RBC 4.53 4.50 - 5.90 x10*6/uL    Hemoglobin 14.0 13.5 - 17.5 g/dL    Hematocrit 40.8 (L) 41.0 - 52.0 %    MCV 90 80 - 100 fL    MCH 30.9 26.0 - 34.0 pg    MCHC 34.3 32.0 - 36.0 g/dL    RDW 11.8 11.5 - 14.5 %    Platelets 238 150 - 450 x10*3/uL   Basic metabolic panel   Result Value Ref Range    Glucose 104 (H) 74 - 99 mg/dL    Sodium 139 136 - 145 mmol/L    Potassium 4.1 3.5 - 5.3 mmol/L    Chloride 102 98 - 107 mmol/L    Bicarbonate 31 21 - 32 mmol/L    Anion Gap 10 10 - 20 mmol/L    Urea Nitrogen 12  6 - 23 mg/dL    Creatinine 1.03 0.50 - 1.30 mg/dL    eGFR >90 >60 mL/min/1.73m*2    Calcium 9.0 8.6 - 10.3 mg/dL   Lipase   Result Value Ref Range    Lipase 14 9 - 82 U/L   Amylase   Result Value Ref Range    Amylase 48 29 - 103 U/L                   Assessment/Plan   This patient currently has cardiac telemetry ordered; if you would like to modify or discontinue the telemetry order, click here to go to the orders activity to modify/discontinue the order.      Principal Problem:    Vertigo  Active Problems:    Allergic rhinitis    Migraine without aura    Gastroesophageal reflux disease without esophagitis    Vertigo  Migraine without aura  BPPV  -CT Brain: No acute intracranial process.  -Valium 5 mg IV, Atarax 25 mg PO, Antivert 25 mg PO, Zofran 4 mg IV, and LR 1,000 ml IV given in ED with no effect.  -Reglan, Fiorcet 50 -325 mg q4h, prn Antivert, Toradol prn.  -Eply maneuver done by Dr. Carnes with some improvement  -outpatient vestibular therapy    Abdominal pain  Nausea/Vomiting  - CT abd/pelvis pending  - continue zofran 4 mg q6h prn    Allergic rhinitis  -Zyrtec 10 mg PO Daily   -Flonase Nasal Daily      GERD  -Pantoprazole 40 mg PO Daily         DVT Prophylaxis:   - enoxaparin        Code Status: Full Code    Disposition: Patient requires less than 2 days.    Seen with and care discussed with MD Katelynn Mclean, APRN-CNP

## 2024-08-10 NOTE — H&P
History Of Present Illness  Russel Willis is a 33 y.o. male presenting with dizziness x 3 days. Patient states he was at a concert 4 days ago and when he woke up he had vertigo symptoms. Patient states vertigo is intermittent and describes as a room spinning off-balance sensation. Patient states he has never been diagnosed with vertigo in the past. Patient states vertigo is worse with head movement or ambulating. Patient denies any vision changes. Patient does have headache in bilateral temples. Patient does have nausea and vomiting endorses 2 episodes of emesis today and 1 episode yesterday. Patient has been able to tolerate some p.o. intake. Patient denies any chest pain shortness of breath abdominal pain diarrhea constipation. Patient does endorse tingling throughout his entire body. Patient denies any tobacco alcohol or street drug abuse.  Patient currently resting in bed, does endorse continued vertigo worse with movement.  Discussed treatment plan, patient states understanding, and agrees with plan.       Past Medical History  Past Medical History:   Diagnosis Date    Anesthesia of skin 06/25/2019    Numbness and tingling    Disorder of penis, unspecified 03/02/2021    Penile lesion    Dysphagia, oropharyngeal phase 09/23/2019    Dysphagia, oropharyngeal phase    Gastro-esophageal reflux disease without esophagitis 11/11/2019    GERD without esophagitis    Localized swelling, mass and lump, neck 08/27/2019    Mass of right side of neck    Other specified anxiety disorders 06/19/2019    Situational anxiety    Otitis media, unspecified, left ear 06/18/2020    Acute left otitis media    Personal history of other diseases of the digestive system 01/12/2021    History of gastroesophageal reflux (GERD)    Personal history of other diseases of the nervous system and sense organs 06/18/2020    History of otitis media    Personal history of other diseases of the nervous system and sense organs 10/10/2019     History of sleep apnea    Personal history of other diseases of the respiratory system 06/04/2015    History of asthma    Personal history of other diseases of the respiratory system 08/27/2019    History of acute bronchitis    Personal history of other infectious and parasitic diseases 10/16/2019    History of Helicobacter pylori infection    Personal history of other specified conditions 11/09/2020    History of nasal congestion    Personal history of other specified conditions 09/23/2019    History of dysphagia    Personal history of other specified conditions 11/09/2020    History of nasal congestion    Personal history of other specified conditions 06/19/2019    History of shortness of breath    Personal history of other specified conditions 11/11/2019    History of palpitations    Personal history of other specified conditions 09/23/2019    History of chest pain    Shortness of breath 06/17/2019    Exertional shortness of breath    Unspecified otitis externa, left ear 06/18/2020    Left otitis externa    Vertigo        Surgical History  Past Surgical History:   Procedure Laterality Date    NO PAST SURGERIES          Social History  He reports that he has never smoked. He has been exposed to tobacco smoke. He uses smokeless tobacco. He reports current alcohol use. He reports that he does not use drugs.    Family History  Family History   Problem Relation Name Age of Onset    Osteoarthritis Mother      Other (t2dm) Father          Allergies  Patient has no known allergies.    Review of Systems   Constitutional: Negative.    HENT:  Positive for congestion and ear pain.    Eyes: Negative.    Respiratory: Negative.     Cardiovascular: Negative.    Gastrointestinal:  Positive for nausea and vomiting.   Endocrine: Negative.    Genitourinary: Negative.    Musculoskeletal: Negative.    Skin: Negative.    Allergic/Immunologic: Negative.    Neurological:  Positive for dizziness.   Hematological: Negative.   "  Psychiatric/Behavioral: Negative.          Physical Exam  Constitutional:       Appearance: He is obese.   HENT:      Head: Normocephalic and atraumatic.      Nose: Nose normal.      Mouth/Throat:      Mouth: Mucous membranes are moist.   Eyes:      Extraocular Movements: Extraocular movements intact.      Pupils: Pupils are equal, round, and reactive to light.   Cardiovascular:      Rate and Rhythm: Normal rate and regular rhythm.      Pulses: Normal pulses.      Heart sounds: Normal heart sounds.   Pulmonary:      Effort: Pulmonary effort is normal.      Breath sounds: Normal breath sounds.   Abdominal:      General: Bowel sounds are normal.      Palpations: Abdomen is soft.   Musculoskeletal:         General: Normal range of motion.      Cervical back: Normal range of motion.   Skin:     General: Skin is warm and dry.      Capillary Refill: Capillary refill takes less than 2 seconds.   Neurological:      Mental Status: He is alert and oriented to person, place, and time. Mental status is at baseline.   Psychiatric:         Mood and Affect: Mood normal.         Behavior: Behavior normal.          Last Recorded Vitals  Blood pressure 140/86, pulse 75, temperature 36 °C (96.8 °F), temperature source Temporal, resp. rate 16, height 1.727 m (5' 8\"), weight 107 kg (235 lb), SpO2 97%.    Relevant Results  Scheduled medications  [START ON 8/10/2024] cetirizine, 10 mg, oral, Daily  enoxaparin, 40 mg, subcutaneous, q24h  [START ON 8/10/2024] fluticasone, 2 spray, Each Nostril, Daily  [START ON 8/10/2024] pantoprazole, 40 mg, oral, Daily before breakfast      Continuous medications  sodium chloride 0.9%, 100 mL/hr, Last Rate: 100 mL/hr (08/09/24 2313)      PRN medications  PRN medications: acetaminophen-caffeine, ketorolac, meclizine, melatonin, metoclopramide, ondansetron, polyethylene glycol    CT head wo IV contrast    Result Date: 8/9/2024  Interpreted By:  Mik Maya, STUDY: CT HEAD WO IV CONTRAST;  8/9/2024 " 4:12 pm   INDICATION: Signs/Symptoms:dizzy.   COMPARISON: 06/15/2020.   ACCESSION NUMBER(S): [id number]   ORDERING CLINICIAN: DAMON NEVAREZ   TECHNIQUE: Contiguous unenhanced axial images were obtained through the brain.   FINDINGS: INTRACRANIAL: No acute intracranial bleed, midline shift, or mass effect is seen. Gray-white differentiation is maintained. No extra-axial fluid collection or hydrocephalus.   Bones are intact.   EXTRACRANIAL: Visualized paranasal sinuses and mastoids are clear.       No acute intracranial process.       MACRO: None.   Signed by: Mik Maya 8/9/2024 4:24 PM Dictation workstation:   [id number]     Results for orders placed or performed during the hospital encounter of 08/09/24 (from the past 24 hour(s))   POCT GLUCOSE   Result Value Ref Range    POCT Glucose 101 (H) 74 - 99 mg/dL   CBC and Auto Differential   Result Value Ref Range    WBC 6.7 4.4 - 11.3 x10*3/uL    nRBC 0.0 0.0 - 0.0 /100 WBCs    RBC 4.72 4.50 - 5.90 x10*6/uL    Hemoglobin 14.4 13.5 - 17.5 g/dL    Hematocrit 42.5 41.0 - 52.0 %    MCV 90 80 - 100 fL    MCH 30.5 26.0 - 34.0 pg    MCHC 33.9 32.0 - 36.0 g/dL    RDW 11.7 11.5 - 14.5 %    Platelets 250 150 - 450 x10*3/uL    Neutrophils % 49.0 40.0 - 80.0 %    Immature Granulocytes %, Automated 0.4 0.0 - 0.9 %    Lymphocytes % 39.8 13.0 - 44.0 %    Monocytes % 6.7 2.0 - 10.0 %    Eosinophils % 3.4 0.0 - 6.0 %    Basophils % 0.7 0.0 - 2.0 %    Neutrophils Absolute 3.26 1.20 - 7.70 x10*3/uL    Immature Granulocytes Absolute, Automated 0.03 0.00 - 0.70 x10*3/uL    Lymphocytes Absolute 2.66 1.20 - 4.80 x10*3/uL    Monocytes Absolute 0.45 0.10 - 1.00 x10*3/uL    Eosinophils Absolute 0.23 0.00 - 0.70 x10*3/uL    Basophils Absolute 0.05 0.00 - 0.10 x10*3/uL   Comprehensive metabolic panel   Result Value Ref Range    Glucose 103 (H) 74 - 99 mg/dL    Sodium 140 136 - 145 mmol/L    Potassium 3.6 3.5 - 5.3 mmol/L    Chloride 102 98 - 107 mmol/L    Bicarbonate 32 21 - 32  mmol/L    Anion Gap 10 10 - 20 mmol/L    Urea Nitrogen 12 6 - 23 mg/dL    Creatinine 1.19 0.50 - 1.30 mg/dL    eGFR 84 >60 mL/min/1.73m*2    Calcium 9.6 8.6 - 10.3 mg/dL    Albumin 4.3 3.4 - 5.0 g/dL    Alkaline Phosphatase 50 33 - 120 U/L    Total Protein 7.4 6.4 - 8.2 g/dL    AST 22 9 - 39 U/L    Bilirubin, Total 0.6 0.0 - 1.2 mg/dL    ALT 44 10 - 52 U/L   Magnesium   Result Value Ref Range    Magnesium 1.76 1.60 - 2.40 mg/dL   Urinalysis with Reflex Culture and Microscopic   Result Value Ref Range    Color, Urine Light-Yellow Light-Yellow, Yellow, Dark-Yellow    Appearance, Urine Clear Clear    Specific Gravity, Urine 1.022 1.005 - 1.035    pH, Urine 6.5 5.0, 5.5, 6.0, 6.5, 7.0, 7.5, 8.0    Protein, Urine NEGATIVE NEGATIVE, 10 (TRACE), 20 (TRACE) mg/dL    Glucose, Urine Normal Normal mg/dL    Blood, Urine 0.03 (TRACE) (A) NEGATIVE    Ketones, Urine NEGATIVE NEGATIVE mg/dL    Bilirubin, Urine NEGATIVE NEGATIVE    Urobilinogen, Urine Normal Normal mg/dL    Nitrite, Urine NEGATIVE NEGATIVE    Leukocyte Esterase, Urine NEGATIVE NEGATIVE   Urinalysis Microscopic   Result Value Ref Range    WBC, Urine 1-5 1-5, NONE /HPF    RBC, Urine 1-2 NONE, 1-2, 3-5 /HPF    Mucus, Urine FEW Reference range not established. /LPF        Assessment/Plan   Principal Problem:    Vertigo  Active Problems:    Allergic rhinitis    Migraine without aura    Gastroesophageal reflux disease without esophagitis      Principal Problem:    #Vertigo    #Migraine without aura  -CT Brain  IMPRESSION:  No acute intracranial process.  -Valium 5 mg IV, Atarax 25 mg PO, Antivert 25 mg PO, Zofran 4 mg IV, and LR 1,000 ml IV given in ED with no effect.  -Reglan, Tylenol/Caffeine, Antivert, Toradol prn.  -MRI/MRA Head Ordered    Active Problems:    #Allergic rhinitis  -Zyrtec 10 mg PO Daily   -Flonase Nasal Daily       #GERD  -Protonix 40 mg PO Daily       DVT Prophylaxis: Lovenox   Fluids: NS @ 100 ml/hr   Nutrition: Regular     Code Status: Full  Code    Pt requires inpatient stay at this time.  Total accumulated time spent face to face and not face to face preparing to see the patient, obtaining and reviewing separately obtained history; performing a medically appropriate examination and/or evaluation; counseling and educating the patient, family; ordering medications, tests, or procedures; referring and communicating with other health care professionals; documenting clinical information in the patient's medical record; independently interpreting results and communicating the results to the patient, family; and care coordination was 45 minutes.      JESSICA Bradshaw    Attending Attestation:    I was present with the APRN-CNP who participated in the documentation of this note. I have personally seen and re-examined the patient and performed the medical decision-making components (assessment and plan of care). I have reviewed the documentation and verified the findings in the note as written with additions or exceptions as stated in the body of this note.    Dr. Yamel Hart MD  Internal Medicine

## 2024-08-10 NOTE — NURSING NOTE
1535: Received patient from ICU. Oriented patient to room and call bell system. Patient denies pain. Agree with previous nursing assessment     1752: Patient requesting ibuprofen for complaints of a headache. Patient states the medication he received earlier did not help. Provider aware of same. See new order    1810: The patient's goals for the shift include minimize dizziness and nausea    The clinical goals for the shift include patient will be able to tolerate oral nutrition today. no vomiting    Over the shift, the patient did  make progress toward the following goals.

## 2024-08-10 NOTE — CARE PLAN
The patient's goals for the shift include minimize dizziness and nausea    The clinical goals for the shift include minimize dizziness      Problem: Pain - Adult  Goal: Verbalizes/displays adequate comfort level or baseline comfort level  Outcome: Progressing     Problem: Safety - Adult  Goal: Free from fall injury  Outcome: Progressing     Problem: Discharge Planning  Goal: Discharge to home or other facility with appropriate resources  Outcome: Progressing     Problem: Chronic Conditions and Co-morbidities  Goal: Patient's chronic conditions and co-morbidity symptoms are monitored and maintained or improved  Outcome: Progressing

## 2024-08-10 NOTE — CONSULTS
Cardiology Consult Note  Patient: Russel Willis  Unit/Bed: 03/03-A  YOB: 1990    Admitting Diagnosis: Vertigo [R42]  Date:  8/9/2024  Hospital Day: 0  Attending: Cardiology consulting at the request of  Yamel Hart MD  for near syncope     Chief Complaint   Patient presents with    Dizziness    Vomiting    Headache            History of Present Illness:   Russel Willis is a 33 y.o. male who presented via [facility] with c/o dizziness           Past medical history significant for  obesity    Primary Cardiology outpatient: NONE         SUBJECTIVE:   He denies significant cardiac or medical history.  He once wore a Holter monitor with c/o palpitations ~5 years ago which returned unremarkable;  he was diagnosed with anxiety    He typically drinks ETOH socially (rare) and vapes nicotine.  He was at a concert the night before presentation where he drank 1 been and vaped THC,  which is unusual for him   He describes persistent dizziness, worse on standing and moving his head            12 system review of symptoms is negative except as noted above         Allergies:  No Known Allergies   Home Medication:  No medications prior to admission.      PMHx:  Past Medical History:   Diagnosis Date    Anesthesia of skin 06/25/2019    Numbness and tingling    Disorder of penis, unspecified 03/02/2021    Penile lesion    Dysphagia, oropharyngeal phase 09/23/2019    Dysphagia, oropharyngeal phase    Gastro-esophageal reflux disease without esophagitis 11/11/2019    GERD without esophagitis    Localized swelling, mass and lump, neck 08/27/2019    Mass of right side of neck    Other specified anxiety disorders 06/19/2019    Situational anxiety    Otitis media, unspecified, left ear 06/18/2020    Acute left otitis media    Personal history of other diseases of the digestive system 01/12/2021    History of gastroesophageal reflux (GERD)    Personal history of other diseases of the nervous system and  sense organs 06/18/2020    History of otitis media    Personal history of other diseases of the nervous system and sense organs 10/10/2019    History of sleep apnea    Personal history of other diseases of the respiratory system 06/04/2015    History of asthma    Personal history of other diseases of the respiratory system 08/27/2019    History of acute bronchitis    Personal history of other infectious and parasitic diseases 10/16/2019    History of Helicobacter pylori infection    Personal history of other specified conditions 11/09/2020    History of nasal congestion    Personal history of other specified conditions 09/23/2019    History of dysphagia    Personal history of other specified conditions 11/09/2020    History of nasal congestion    Personal history of other specified conditions 06/19/2019    History of shortness of breath    Personal history of other specified conditions 11/11/2019    History of palpitations    Personal history of other specified conditions 09/23/2019    History of chest pain    Shortness of breath 06/17/2019    Exertional shortness of breath    Unspecified otitis externa, left ear 06/18/2020    Left otitis externa    Vertigo        PSHx:  Past Surgical History:   Procedure Laterality Date    NO PAST SURGERIES         Social Hx:  Social History     Socioeconomic History    Marital status:    Tobacco Use    Smoking status: Never     Passive exposure: Current    Smokeless tobacco: Current   Vaping Use    Vaping status: Every Day    Substances: Nicotine, Flavoring   Substance and Sexual Activity    Alcohol use: Yes     Comment: social    Drug use: Never     Social Determinants of Health     Financial Resource Strain: Low Risk  (8/9/2024)    Overall Financial Resource Strain (CARDIA)     Difficulty of Paying Living Expenses: Not very hard   Transportation Needs: No Transportation Needs (8/9/2024)    PRAPARE - Transportation     Lack of Transportation (Medical): No     Lack of  Transportation (Non-Medical): No   Housing Stability: Low Risk  (8/9/2024)    Housing Stability Vital Sign     Unable to Pay for Housing in the Last Year: No     Number of Times Moved in the Last Year: 1     Homeless in the Last Year: No       Family Hx:  Family History   Problem Relation Name Age of Onset    Osteoarthritis Mother      Other (t2dm) Father         Review of Systems:   12 system review of symptoms is negative except as noted above          OBJECTIVE  Vitals:   Visit Vitals  /71   Pulse 78   Temp 36.4 °C (97.5 °F) (Temporal)   Resp 15        Wt Readings from Last 5 Encounters:   08/09/24 107 kg (235 lb)   12/27/23 98 kg (216 lb)   06/30/23 103 kg (226 lb)   12/22/21 101 kg (222 lb 6 oz)   05/19/21 101 kg (222 lb)       Intake / Output:   I/O last 3 completed shifts:  In: 1750.7 (16.4 mL/kg) [I.V.:751.7 (7.1 mL/kg); IV Piggyback:999]  Out: - (0 mL/kg)   Weight: 106.6 kg      Tele monitoring: NSR     Physical Examination:    Constitutional:       Appearance: Healthy appearance. Not in distress.   Eyes:      Conjunctiva/sclera: Conjunctivae normal.   Neck:      Vascular: JVD normal.   Pulmonary:      Effort: Pulmonary effort is normal.      Breath sounds: Normal breath sounds.   Cardiovascular:      PMI at left midclavicular line. Normal rate. Regular rhythm. Normal S1. Normal S2.       Murmurs: There is no murmur.      No rub.   Pulses:     Intact distal pulses.   Edema:     Peripheral edema absent.   Abdominal:      General: Bowel sounds are normal.   Musculoskeletal:      Cervical back: Neck supple. Skin:     General: Skin is warm and dry.   Neurological:      Mental Status: Alert and oriented to person, place and time.            LABS:  CMP:   Recent Labs     08/10/24  0542 08/09/24  1439 01/19/22  1613 06/15/20  1656 01/04/20  0704 10/21/19  1728 09/18/19  1951 09/01/19  2028 07/05/19  0140 04/21/19  1733 03/17/19  1706    140 138 137 139 140 137 138 138 137 138   K 4.1 3.6 4.2 4.1 4.4  "4.1 3.6 3.6 3.7 3.5 3.4*    102 100 100 103 101 101 99 100 98 100   CO2 31 32 30 28 30 29 30 33* 32 31 30   ANIONGAP 10 10 12 9 10 10 10 10 10 12 11   BUN 12 12 11 12 16 10 11 12 15 11 11   CREATININE 1.03 1.19 1.19 1.0 1.03 1.1 1.15 1.31* 1.42* 1.13 1.17   EGFR >90 84  --  95  --  85  --   --   --   --   --    MG  --  1.76  --   --   --   --   --   --   --   --  1.90     LIVER ENZYMES:   Recent Labs     08/09/24  1439 06/15/20  1656 01/04/20  0704 09/18/19  1951 09/01/19 2028 04/21/19  1733 06/27/18  2354   ALBUMIN 4.3 4.4 4.5 4.3 4.4 4.4 4.6   ALT 44 29 20 18 21 23 24   AST 22 25 22 13 16 16 21   BILITOT 0.6 0.3 0.5 0.8 0.5 0.5 0.6   LIPASE  --   --  17 18  --   --   --      CBC:  Recent Labs     08/10/24  0542 08/09/24  1439 01/19/22  1613 06/15/20  1656 01/04/20  0704 10/21/19  1728 09/18/19 1951 09/01/19 2028   WBC 8.1 6.7 7.5 8.4 7.1 6.9 8.1 8.1   HGB 14.0 14.4 13.3* 15.2 14.7 13.0* 13.9 14.1   HCT 40.8* 42.5 40.8* 44.8 43.7 38.8* 40.5* 40.8*    250 256 255 279 246 251 264   MCV 90 90 94 94.3 94 93.3 92 92     COAG:   Recent Labs     07/05/19  0140 04/21/19  1733   INR 1.1 1.0     ABO: No results for input(s): \"ABO\" in the last 38744 hours.  HEME/ENDO:  Recent Labs     01/12/21  1520 06/27/18  2354   TSH 0.84 1.55      CARDIAC: No results for input(s): \"LDH\", \"CKMB\", \"TROPHS\", \"BNP\" in the last 93371 hours.    No lab exists for component: \"CK\", \"CKMBP\"    Lipid Panel:  No results found for: \"HDL\", \"CHHDL\", \"VLDL\", \"TRIG\", \"NHDL\"       Current Medications:   MEDICATIONS  Infusions:  sodium chloride 0.9%, Last Rate: 100 mL/hr (08/10/24 0644)      Scheduled:  cetirizine, 10 mg, Daily  enoxaparin, 40 mg, q24h  fluticasone, 2 spray, Daily  pantoprazole, 40 mg, Daily before breakfast      PRN:  acetaminophen-caffeine, 2 tablet, q6h PRN  alum-mag hydroxide-simeth, 10 mL, 4x daily PRN  calcium carbonate, 500 mg, 4x daily PRN  ketorolac, 15 mg, q6h PRN  meclizine, 25 mg, TID PRN  melatonin, 3 mg, " Nightly PRN  metoclopramide, 10 mg, q6h PRN  ondansetron, 4 mg, q8h PRN  polyethylene glycol, 17 g, Daily PRN          LAST IMAGING RESULTS   CT head wo IV contrast  Narrative: Interpreted By:  Mik Maya,   STUDY:  CT HEAD WO IV CONTRAST;  8/9/2024 4:12 pm      INDICATION:  Signs/Symptoms:dizzy.      COMPARISON:  06/15/2020.      ACCESSION NUMBER(S):  [id number]      ORDERING CLINICIAN:  DAMON NEVAREZ      TECHNIQUE:  Contiguous unenhanced axial images were obtained through the brain.      FINDINGS:  INTRACRANIAL:  No acute intracranial bleed, midline shift, or mass effect is seen.  Gray-white differentiation is maintained. No extra-axial fluid  collection or hydrocephalus.      Bones are intact.      EXTRACRANIAL:  Visualized paranasal sinuses and mastoids are clear.      Impression: No acute intracranial process.              MACRO:  None.      Signed by: Mik Maya 8/9/2024 4:24 PM  Dictation workstation:   [id number]           Cardiovascular studies:        EKG dated 8/09/2024 independently reviewed   NSR 66, RBBB         Echocardiogram 4/19/2019  CONCLUSIONS:   1. The left ventricular systolic function is normal with a 60-65% estimated ejection fraction.   2. RVSP within normal limits.                  Assessment:   33 YOM with benign positional vertigo after ETOH and THC   Obesity          Plan:  No further cardiac testing at this time  We discussed symptoms that should prompt cardiac follow up   OK to DC from a CV perspective                  Thank you  for the consult   Will sign off   Please call or message with questions or concerns   The case was discussed with JESSICA King           Electronically signed by JESSICA Mckeon on 8/10/2024 at 7:12 AM

## 2024-08-11 ENCOUNTER — APPOINTMENT (OUTPATIENT)
Dept: RADIOLOGY | Facility: HOSPITAL | Age: 34
End: 2024-08-11
Payer: COMMERCIAL

## 2024-08-11 VITALS
HEART RATE: 70 BPM | HEIGHT: 68 IN | WEIGHT: 241.62 LBS | BODY MASS INDEX: 36.62 KG/M2 | OXYGEN SATURATION: 96 % | RESPIRATION RATE: 16 BRPM | SYSTOLIC BLOOD PRESSURE: 140 MMHG | TEMPERATURE: 98.4 F | DIASTOLIC BLOOD PRESSURE: 79 MMHG

## 2024-08-11 LAB
ANION GAP SERPL CALC-SCNC: 9 MMOL/L (ref 10–20)
BUN SERPL-MCNC: 12 MG/DL (ref 6–23)
CALCIUM SERPL-MCNC: 9.4 MG/DL (ref 8.6–10.3)
CHLORIDE SERPL-SCNC: 103 MMOL/L (ref 98–107)
CO2 SERPL-SCNC: 31 MMOL/L (ref 21–32)
CREAT SERPL-MCNC: 1.15 MG/DL (ref 0.5–1.3)
EGFRCR SERPLBLD CKD-EPI 2021: 87 ML/MIN/1.73M*2
ERYTHROCYTE [DISTWIDTH] IN BLOOD BY AUTOMATED COUNT: 11.7 % (ref 11.5–14.5)
GLUCOSE SERPL-MCNC: 99 MG/DL (ref 74–99)
HCT VFR BLD AUTO: 42.7 % (ref 41–52)
HGB BLD-MCNC: 14.4 G/DL (ref 13.5–17.5)
MCH RBC QN AUTO: 30.5 PG (ref 26–34)
MCHC RBC AUTO-ENTMCNC: 33.7 G/DL (ref 32–36)
MCV RBC AUTO: 91 FL (ref 80–100)
NRBC BLD-RTO: 0 /100 WBCS (ref 0–0)
PLATELET # BLD AUTO: 229 X10*3/UL (ref 150–450)
POTASSIUM SERPL-SCNC: 4.5 MMOL/L (ref 3.5–5.3)
RBC # BLD AUTO: 4.72 X10*6/UL (ref 4.5–5.9)
SODIUM SERPL-SCNC: 138 MMOL/L (ref 136–145)
WBC # BLD AUTO: 6.6 X10*3/UL (ref 4.4–11.3)

## 2024-08-11 PROCEDURE — 2500000004 HC RX 250 GENERAL PHARMACY W/ HCPCS (ALT 636 FOR OP/ED)

## 2024-08-11 PROCEDURE — 2500000001 HC RX 250 WO HCPCS SELF ADMINISTERED DRUGS (ALT 637 FOR MEDICARE OP): Performed by: NURSE PRACTITIONER

## 2024-08-11 PROCEDURE — 94760 N-INVAS EAR/PLS OXIMETRY 1: CPT

## 2024-08-11 PROCEDURE — 96372 THER/PROPH/DIAG INJ SC/IM: CPT

## 2024-08-11 PROCEDURE — 2500000004 HC RX 250 GENERAL PHARMACY W/ HCPCS (ALT 636 FOR OP/ED): Performed by: NURSE PRACTITIONER

## 2024-08-11 PROCEDURE — 99222 1ST HOSP IP/OBS MODERATE 55: CPT | Performed by: SURGERY

## 2024-08-11 PROCEDURE — 2500000001 HC RX 250 WO HCPCS SELF ADMINISTERED DRUGS (ALT 637 FOR MEDICARE OP)

## 2024-08-11 PROCEDURE — 70547 MR ANGIOGRAPHY NECK W/O DYE: CPT

## 2024-08-11 PROCEDURE — 70547 MR ANGIOGRAPHY NECK W/O DYE: CPT | Performed by: RADIOLOGY

## 2024-08-11 PROCEDURE — 80048 BASIC METABOLIC PNL TOTAL CA: CPT

## 2024-08-11 PROCEDURE — 85027 COMPLETE CBC AUTOMATED: CPT

## 2024-08-11 PROCEDURE — 70551 MRI BRAIN STEM W/O DYE: CPT

## 2024-08-11 PROCEDURE — 36415 COLL VENOUS BLD VENIPUNCTURE: CPT

## 2024-08-11 PROCEDURE — 96375 TX/PRO/DX INJ NEW DRUG ADDON: CPT

## 2024-08-11 PROCEDURE — G0378 HOSPITAL OBSERVATION PER HR: HCPCS

## 2024-08-11 PROCEDURE — 70551 MRI BRAIN STEM W/O DYE: CPT | Performed by: RADIOLOGY

## 2024-08-11 PROCEDURE — 70544 MR ANGIOGRAPHY HEAD W/O DYE: CPT | Mod: 59

## 2024-08-11 RX ORDER — MECLIZINE HCL 12.5 MG 12.5 MG/1
25 TABLET ORAL 3 TIMES DAILY
Status: DISPENSED | OUTPATIENT
Start: 2024-08-11

## 2024-08-11 RX ORDER — PANTOPRAZOLE SODIUM 40 MG/1
40 TABLET, DELAYED RELEASE ORAL
Status: DISPENSED | OUTPATIENT
Start: 2024-08-11

## 2024-08-11 RX ORDER — SODIUM CHLORIDE 9 MG/ML
INJECTION, SOLUTION INTRAVENOUS
Status: COMPLETED
Start: 2024-08-11 | End: 2024-08-11

## 2024-08-11 RX ORDER — LORAZEPAM 2 MG/ML
1 INJECTION INTRAMUSCULAR ONCE
Status: COMPLETED | OUTPATIENT
Start: 2024-08-11 | End: 2024-08-11

## 2024-08-11 RX ORDER — SCOLOPAMINE TRANSDERMAL SYSTEM 1 MG/1
1 PATCH, EXTENDED RELEASE TRANSDERMAL
Status: DISPENSED | OUTPATIENT
Start: 2024-08-11

## 2024-08-11 RX ORDER — METOCLOPRAMIDE HYDROCHLORIDE 5 MG/ML
10 INJECTION INTRAMUSCULAR; INTRAVENOUS EVERY 6 HOURS PRN
Status: DISCONTINUED | OUTPATIENT
Start: 2024-08-11 | End: 2024-08-11

## 2024-08-11 RX ORDER — BUTALBITAL, ACETAMINOPHEN AND CAFFEINE 50; 325; 40 MG/1; MG/1; MG/1
2 TABLET ORAL EVERY 4 HOURS PRN
Status: ACTIVE | OUTPATIENT
Start: 2024-08-11

## 2024-08-11 RX ORDER — PROMETHAZINE HYDROCHLORIDE 25 MG/ML
INJECTION, SOLUTION INTRAMUSCULAR; INTRAVENOUS
Status: DISPENSED
Start: 2024-08-11 | End: 2024-08-11

## 2024-08-11 RX ADMIN — PROMETHAZINE HYDROCHLORIDE 25 MG: 25 INJECTION INTRAMUSCULAR; INTRAVENOUS at 15:50

## 2024-08-11 RX ADMIN — MECLIZINE HYDROCHLORIDE 25 MG: 12.5 TABLET ORAL at 16:43

## 2024-08-11 RX ADMIN — LORAZEPAM 1 MG: 2 INJECTION INTRAMUSCULAR; INTRAVENOUS at 16:43

## 2024-08-11 RX ADMIN — ENOXAPARIN SODIUM 40 MG: 40 INJECTION SUBCUTANEOUS at 23:06

## 2024-08-11 RX ADMIN — FLUTICASONE PROPIONATE 2 SPRAY: 50 SPRAY, METERED NASAL at 10:30

## 2024-08-11 RX ADMIN — SCOPALAMINE 1 PATCH: 1 PATCH, EXTENDED RELEASE TRANSDERMAL at 10:19

## 2024-08-11 RX ADMIN — PANTOPRAZOLE SODIUM 40 MG: 40 TABLET, DELAYED RELEASE ORAL at 06:33

## 2024-08-11 RX ADMIN — MECLIZINE HYDROCHLORIDE 25 MG: 12.5 TABLET ORAL at 10:18

## 2024-08-11 RX ADMIN — CETIRIZINE HYDROCHLORIDE 10 MG: 10 TABLET, FILM COATED ORAL at 10:18

## 2024-08-11 RX ADMIN — MECLIZINE HYDROCHLORIDE 25 MG: 12.5 TABLET ORAL at 21:07

## 2024-08-11 RX ADMIN — SODIUM CHLORIDE: 9 INJECTION, SOLUTION INTRAVENOUS at 08:00

## 2024-08-11 RX ADMIN — PANTOPRAZOLE SODIUM 40 MG: 40 TABLET, DELAYED RELEASE ORAL at 16:43

## 2024-08-11 RX ADMIN — PROMETHAZINE HYDROCHLORIDE 25 MG: 25 INJECTION INTRAMUSCULAR; INTRAVENOUS at 08:10

## 2024-08-11 ASSESSMENT — COGNITIVE AND FUNCTIONAL STATUS - GENERAL
DAILY ACTIVITIY SCORE: 24
MOBILITY SCORE: 24

## 2024-08-11 ASSESSMENT — PAIN SCALES - GENERAL
PAINLEVEL_OUTOF10: 0 - NO PAIN

## 2024-08-11 ASSESSMENT — ENCOUNTER SYMPTOMS
VOMITING: 1
HEADACHES: 1
ABDOMINAL PAIN: 1
DIZZINESS: 1

## 2024-08-11 ASSESSMENT — PAIN - FUNCTIONAL ASSESSMENT
PAIN_FUNCTIONAL_ASSESSMENT: 0-10
PAIN_FUNCTIONAL_ASSESSMENT: 0-10

## 2024-08-11 NOTE — CONSULTS
Dizziness  Associated symptoms include abdominal pain, headaches and vomiting.   Vomiting   Associated symptoms include abdominal pain, dizziness and headaches.   Headache   Associated symptoms include abdominal pain, dizziness and vomiting.     This a consult for patient who presented with dizziness vertigo and severe heartburn with nonspecific abdominal pain.  This a morbidly obese 33-year-old male who presented with dizziness has had this for a while.  He takes meclizine.  He also has a history of GERD.  He developed severe GERD and upper abdominal pain and then also developed dizziness.  He was concerned that his GERD was causing his dizziness.  The pain was generalized.  He had no nausea or vomiting.  He was seen in the emergency room and a CT scan confirmed no obvious abnormalities.  Labs are unremarkable.  The patient had a previous EGD on October 11, 2019 which confirmed gastritis.  Past Medical History:   Diagnosis Date    Anesthesia of skin 06/25/2019    Numbness and tingling    Disorder of penis, unspecified 03/02/2021    Penile lesion    Dysphagia, oropharyngeal phase 09/23/2019    Dysphagia, oropharyngeal phase    Gastro-esophageal reflux disease without esophagitis 11/11/2019    GERD without esophagitis    Localized swelling, mass and lump, neck 08/27/2019    Mass of right side of neck    Other specified anxiety disorders 06/19/2019    Situational anxiety    Otitis media, unspecified, left ear 06/18/2020    Acute left otitis media    Personal history of other diseases of the digestive system 01/12/2021    History of gastroesophageal reflux (GERD)    Personal history of other diseases of the nervous system and sense organs 06/18/2020    History of otitis media    Personal history of other diseases of the nervous system and sense organs 10/10/2019    History of sleep apnea    Personal history of other diseases of the respiratory system 06/04/2015    History of asthma    Personal history of other diseases  of the respiratory system 08/27/2019    History of acute bronchitis    Personal history of other infectious and parasitic diseases 10/16/2019    History of Helicobacter pylori infection    Personal history of other specified conditions 11/09/2020    History of nasal congestion    Personal history of other specified conditions 09/23/2019    History of dysphagia    Personal history of other specified conditions 11/09/2020    History of nasal congestion    Personal history of other specified conditions 06/19/2019    History of shortness of breath    Personal history of other specified conditions 11/11/2019    History of palpitations    Personal history of other specified conditions 09/23/2019    History of chest pain    Shortness of breath 06/17/2019    Exertional shortness of breath    Unspecified otitis externa, left ear 06/18/2020    Left otitis externa    Vertigo           Current Facility-Administered Medications:     acetaminophen-caffeine 500-65 mg tablet 2 tablet, 2 tablet, oral, q6h PRN, JESSICA Bradshaw    alum-mag hydroxide-simeth (Mylanta) 200-200-20 mg/5 mL oral suspension 10 mL, 10 mL, oral, 4x daily PRN, JESSICA Bradshaw    butalbital-acetaminophen-caff -40 mg per tablet 1 tablet, 1 tablet, oral, q4h PRN, JESSICA Pagan, 1 tablet at 08/10/24 2146    calcium carbonate (Tums) chewable tablet 500 mg, 500 mg, oral, 4x daily PRN, JESSICA Bradshaw, 500 mg at 08/10/24 0119    cetirizine (ZyrTEC) tablet 10 mg, 10 mg, oral, Daily, JESSICA Bradshaw, 10 mg at 08/09/24 2335    enoxaparin (Lovenox) syringe 40 mg, 40 mg, subcutaneous, q24h, JESSICA Bradshaw, 40 mg at 08/10/24 2303    fluticasone (Flonase) nasal spray 2 spray, 2 spray, Each Nostril, Daily, JESSICA Bradshaw, 2 spray at 08/09/24 2335    ketorolac (Toradol) injection 15 mg, 15 mg, intravenous, q6h PRN, JESSICA Bradshaw    meclizine (Antivert) tablet 25 mg, 25 mg, oral, TID PRN, Morena CARR  JohnVAL-CNP, 25 mg at 08/09/24 2255    melatonin tablet 3 mg, 3 mg, oral, Nightly PRN, Morena Lopez, APRN-CNP, 3 mg at 08/10/24 2146    metoclopramide (Reglan) injection 10 mg, 10 mg, intravenous, q6h PRN, Morena LopezVAL-CNP, 10 mg at 08/09/24 2318    ondansetron (Zofran) injection 4 mg, 4 mg, intravenous, q8h PRN, Morena LopezVAL-CNP, 4 mg at 08/10/24 1132    pantoprazole (ProtoNix) EC tablet 40 mg, 40 mg, oral, Daily before breakfast, Morena LopezJESSICA, 40 mg at 08/11/24 0633    polyethylene glycol (Glycolax, Miralax) packet 17 g, 17 g, oral, Daily PRN, Morena CARR West, APRN-CNP    sodium chloride 0.9% infusion, 100 mL/hr, intravenous, Continuous, Morena LopezJESSICA, Stopped at 08/10/24 0900    sodium chloride 0.9% infusion, 10 mL/hr, intravenous, Continuous PRN, Yamel Hart MD     No Known Allergies     Review of Systems  Review of Systems   Gastrointestinal:  Positive for abdominal pain and vomiting.   Neurological:  Positive for dizziness and headaches.       Objective     Vital signs for last 24 hours:  Temp:  [36 °C (96.8 °F)-36.7 °C (98.1 °F)] 36.7 °C (98.1 °F)  Heart Rate:  [64-75] 75  Resp:  [13-20] 17  BP: (114-137)/(66-78) 136/78  FiO2 (%):  [21 %] 21 %    Intake/Output this shift:  No intake/output data recorded.    Physical Exam  Physical Exam  Vitals reviewed.   Constitutional:       Appearance: Normal appearance.   HENT:      Head: Normocephalic.   Cardiovascular:      Rate and Rhythm: Normal rate and regular rhythm.      Heart sounds: Normal heart sounds.   Pulmonary:      Effort: Pulmonary effort is normal.      Breath sounds: Normal breath sounds.   Abdominal:      General: Abdomen is flat. There is no distension.      Palpations: Abdomen is soft. There is no mass.      Tenderness: There is no abdominal tenderness. There is no guarding.   Musculoskeletal:         General: Normal range of motion.      Cervical back: Normal range of motion.   Skin:     General: Skin is  warm.   Neurological:      General: No focal deficit present.   Psychiatric:         Mood and Affect: Mood normal.         Labs & Radiology      Labs Reviewed   COMPREHENSIVE METABOLIC PANEL - Abnormal       Result Value    Glucose 103 (*)     Sodium 140      Potassium 3.6      Chloride 102      Bicarbonate 32      Anion Gap 10      Urea Nitrogen 12      Creatinine 1.19      eGFR 84      Calcium 9.6      Albumin 4.3      Alkaline Phosphatase 50      Total Protein 7.4      AST 22      Bilirubin, Total 0.6      ALT 44     URINALYSIS WITH REFLEX CULTURE AND MICROSCOPIC - Abnormal    Color, Urine Light-Yellow      Appearance, Urine Clear      Specific Gravity, Urine 1.022      pH, Urine 6.5      Protein, Urine NEGATIVE      Glucose, Urine Normal      Blood, Urine 0.03 (TRACE) (*)     Ketones, Urine NEGATIVE      Bilirubin, Urine NEGATIVE      Urobilinogen, Urine Normal      Nitrite, Urine NEGATIVE      Leukocyte Esterase, Urine NEGATIVE     CBC - Abnormal    WBC 8.1      nRBC 0.0      RBC 4.53      Hemoglobin 14.0      Hematocrit 40.8 (*)     MCV 90      MCH 30.9      MCHC 34.3      RDW 11.8      Platelets 238     BASIC METABOLIC PANEL - Abnormal    Glucose 104 (*)     Sodium 139      Potassium 4.1      Chloride 102      Bicarbonate 31      Anion Gap 10      Urea Nitrogen 12      Creatinine 1.03      eGFR >90      Calcium 9.0     POCT GLUCOSE - Abnormal    POCT Glucose 101 (*)    MAGNESIUM - Normal    Magnesium 1.76     LIPASE - Normal    Lipase 14      Narrative:     Venipuncture immediately after or during the administration of Metamizole may lead to falsely low results. Testing should be performed immediately prior to Metamizole dosing.   AMYLASE - Normal    Amylase 48     CBC WITH AUTO DIFFERENTIAL    WBC 6.7      nRBC 0.0      RBC 4.72      Hemoglobin 14.4      Hematocrit 42.5      MCV 90      MCH 30.5      MCHC 33.9      RDW 11.7      Platelets 250      Neutrophils % 49.0      Immature Granulocytes %, Automated  0.4      Lymphocytes % 39.8      Monocytes % 6.7      Eosinophils % 3.4      Basophils % 0.7      Neutrophils Absolute 3.26      Immature Granulocytes Absolute, Automated 0.03      Lymphocytes Absolute 2.66      Monocytes Absolute 0.45      Eosinophils Absolute 0.23      Basophils Absolute 0.05     URINALYSIS WITH REFLEX CULTURE AND MICROSCOPIC    Narrative:     The following orders were created for panel order Urinalysis with Reflex Culture and Microscopic.                  Procedure                               Abnormality         Status                                     ---------                               -----------         ------                                     Urinalysis with Reflex C...[[id number]]  Abnormal            Final result                               Extra Urine Gray Tube[[id number]]                            Final result                                                 Please view results for these tests on the individual orders.   EXTRA URINE GRAY TUBE    Extra Tube Hold for add-ons.     CBC   BASIC METABOLIC PANEL   POCT GLUCOSE METER   URINALYSIS MICROSCOPIC WITH REFLEX CULTURE    WBC, Urine 1-5      RBC, Urine 1-2      Mucus, Urine FEW       CT abdomen pelvis w IV contrast 08/10/2024    Narrative  Interpreted By:  Heaven Cobb,  STUDY:  CT ABDOMEN PELVIS W IV CONTRAST;  8/10/2024 2:42 pm    INDICATION:  Signs/Symptoms:abdominal pain.    COMPARISON:  09/18/2019    ACCESSION NUMBER(S):  [id number]    ORDERING CLINICIAN:  RUBY BLACK    TECHNIQUE:  CT of the abdomen and pelvis was performed.  75 mL Omnipaque 350    FINDINGS:  LOWER CHEST:  Images of the lung bases show no infiltrate or pleural fluid.    ABDOMEN:    LIVER:  There is a tiny simple cyst involving the tip of the right lobe of  the liver.    BILE DUCTS:  There is no intrahepatic, common hepatic or common bile ductal  dilatation.    GALLBLADDER:  The gallbladder is unremarkable.    PANCREAS:  The pancreas is  unremarkable.    SPLEEN:  The spleen is unremarkable. There is no splenic mass or splenomegaly.    ADRENAL GLANDS:  The adrenal glands are unremarkable.    KIDNEYS AND URETERS:  The kidneys function symmetrically.  There is increase in size of simple right parapelvic cyst compared to  09/18/2019 There is no intrarenal calculus or hydronephrosis.    BOWEL:  There is no bowel wall thickening, dilatation or obstruction.  The appendix is normal.  There are diffuse colonic diverticula with no diverticulitis.    VESSELS:  The abdominal and pelvic vessels are unremarkable.    PERITONEUM/RETROPERITONEUM/LYMPH NODES:  There is no retroperitoneal or pelvic adenopathy.  There is no  ascites.    ABDOMINAL WALL:  The abdominal wall is unremarkable.    BONE AND SOFT TISSUE:  There is no acute osseous finding.    There is no soft tissue abnormality.    Impression  1. No acute abdominal or pelvic pathology.  2. Increase in size right parapelvic cyst.  3. Diffuse colonic diverticula with no diverticulitis.  4. Tiny right hepatic cyst.    MACRO:  None    Signed by: Heaven Cobb 8/10/2024 3:04 PM  Dictation workstation:   [id number]      Impression  Severe GERD.  Plan   I discussed the findings with the patient.  He has many night snacks.  He is appropriate to discontinue this.  He should take his Pepcid routinely twice a day once discharged.  While inpatient can continue his Protonix.  Follow as needed

## 2024-08-11 NOTE — CARE PLAN
The patient's goals for the shift include minimize dizziness and nausea    The clinical goals for the shift include Pt will state decrease in dizziness throughout shift.    Patient remained safe and stable throughout shift. VS were stable and pt denied pain. IV phenergan was administered for N/V and pt tolerated well. Pt currently down at MRI. No other complaints.

## 2024-08-11 NOTE — PROGRESS NOTES
Russel Willis is a 33 y.o. male on day 0 of admission presenting with Vertigo.      Subjective   Patient continues to complained of dizziness, nausea, and vomiting. He wants to go home; but he doesn't feels safe to drive  or do his ADLs. We discuss the POC. He is refusing IVF. He denies pain, SOB.       Objective     Last Recorded Vitals  /78 (BP Location: Right arm, Patient Position: Lying)   Pulse 75   Temp 36.7 °C (98.1 °F) (Temporal)   Resp 17   Wt 110 kg (241 lb 10 oz)   SpO2 96%   Intake/Output last 3 Shifts:    Intake/Output Summary (Last 24 hours) at 8/11/2024 1223  Last data filed at 8/11/2024 0800  Gross per 24 hour   Intake 410 ml   Output 50 ml   Net 360 ml       Admission Weight  Weight: 107 kg (235 lb) (08/09/24 1423)    Daily Weight  08/10/24 : 110 kg (241 lb 10 oz)    Image Results      Physical Exam  Vitals reviewed.   Constitutional:       Appearance: Normal appearance. He is obese.   HENT:      Head: Normocephalic and atraumatic.      Right Ear: External ear normal.      Left Ear: External ear normal.      Nose: Nose normal.      Mouth/Throat:      Mouth: Mucous membranes are moist.      Pharynx: Oropharynx is clear.   Eyes:      Conjunctiva/sclera: Conjunctivae normal.      Pupils: Pupils are equal, round, and reactive to light.   Cardiovascular:      Rate and Rhythm: Normal rate and regular rhythm.      Pulses: Normal pulses.      Heart sounds: Normal heart sounds.   Pulmonary:      Effort: Pulmonary effort is normal.      Breath sounds: Normal breath sounds.   Abdominal:      General: Bowel sounds are normal.      Palpations: Abdomen is soft.   Musculoskeletal:         General: Normal range of motion.      Cervical back: Normal range of motion and neck supple.   Skin:     General: Skin is warm and dry.   Neurological:      General: No focal deficit present.      Mental Status: He is alert and oriented to person, place, and time.   Psychiatric:         Mood and Affect: Mood  normal.         Behavior: Behavior normal.         Relevant Results    Scheduled medications  cetirizine, 10 mg, oral, Daily  enoxaparin, 40 mg, subcutaneous, q24h  fluticasone, 2 spray, Each Nostril, Daily  meclizine, 25 mg, oral, TID  pantoprazole, 40 mg, oral, BID AC  promethazine, , ,   scopolamine, 1 patch, transdermal, q72h      Continuous medications  sodium chloride 0.9%, 10 mL/hr      PRN medications  PRN medications: alum-mag hydroxide-simeth, butalbital-acetaminophen-caff, calcium carbonate, ketorolac, melatonin, ondansetron, polyethylene glycol, promethazine, promethazine, sodium chloride 0.9%      Results for orders placed or performed during the hospital encounter of 08/09/24 (from the past 24 hour(s))   CBC   Result Value Ref Range    WBC 6.6 4.4 - 11.3 x10*3/uL    nRBC 0.0 0.0 - 0.0 /100 WBCs    RBC 4.72 4.50 - 5.90 x10*6/uL    Hemoglobin 14.4 13.5 - 17.5 g/dL    Hematocrit 42.7 41.0 - 52.0 %    MCV 91 80 - 100 fL    MCH 30.5 26.0 - 34.0 pg    MCHC 33.7 32.0 - 36.0 g/dL    RDW 11.7 11.5 - 14.5 %    Platelets 229 150 - 450 x10*3/uL   Basic metabolic panel   Result Value Ref Range    Glucose 99 74 - 99 mg/dL    Sodium 138 136 - 145 mmol/L    Potassium 4.5 3.5 - 5.3 mmol/L    Chloride 103 98 - 107 mmol/L    Bicarbonate 31 21 - 32 mmol/L    Anion Gap 9 (L) 10 - 20 mmol/L    Urea Nitrogen 12 6 - 23 mg/dL    Creatinine 1.15 0.50 - 1.30 mg/dL    eGFR 87 >60 mL/min/1.73m*2    Calcium 9.4 8.6 - 10.3 mg/dL                   Assessment/Plan        Principal Problem:    Vertigo  Active Problems:    Allergic rhinitis    Migraine without aura    Gastroesophageal reflux disease without esophagitis    Vertigo  Dizziness  Migraine without aura  BPPV  -CT Brain: No acute intracranial process.  -Valium 5 mg IV, Atarax 25 mg PO, Antivert 25 mg PO, Zofran 4 mg IV, and LR 1,000 ml IV given in ED with no effect.  -discontinued Reglan,   - continue Fioricet 50 -325 mg q4h, prn, Toradol 15 mg q6h, prn.  -continue meclizine  25 mg q8h scheduled  -Eply maneuver done by Dr. Carnes with some improvement  -outpatient vestibular therapy  -MRI/MRA of Head/neck pending     Abdominal pain  Nausea/Vomiting  - CT abd/pelvis: no acute abdominal or pelvic pathology  - continue zofran 4 mg q6h prn  - added phenergan 25 mg q6h prn  - added scopolamine patch q72 h     Allergic rhinitis  -continue Zyrtec 10 mg PO Daily   -continue Flonase Nasal Daily      GERD  -increased Pantoprazole 40 mg PO BID         DVT Prophylaxis:   - continue enoxaparin 40 mg daily        Code Status: Full Code     Disposition: Patient requires less than 2 days.              Katelynn Rose, APRN-CNP

## 2024-08-11 NOTE — CARE PLAN
Problem: Pain - Adult  Goal: Verbalizes/displays adequate comfort level or baseline comfort level  Outcome: Progressing     Problem: Safety - Adult  Goal: Free from fall injury  Outcome: Progressing     Problem: Discharge Planning  Goal: Discharge to home or other facility with appropriate resources  Outcome: Progressing     Problem: Chronic Conditions and Co-morbidities  Goal: Patient's chronic conditions and co-morbidity symptoms are monitored and maintained or improved  Outcome: Progressing   The patient's goals for the shift include minimize dizziness and nausea    The clinical goals for the shift include Pt will have pain controlled this shift.    Over the shift, the patient did  make progress toward the following goals.

## 2024-08-12 LAB
ANION GAP SERPL CALC-SCNC: 10 MMOL/L (ref 10–20)
BUN SERPL-MCNC: 12 MG/DL (ref 6–23)
CALCIUM SERPL-MCNC: 9.6 MG/DL (ref 8.6–10.3)
CHLORIDE SERPL-SCNC: 101 MMOL/L (ref 98–107)
CO2 SERPL-SCNC: 32 MMOL/L (ref 21–32)
CREAT SERPL-MCNC: 1.15 MG/DL (ref 0.5–1.3)
EGFRCR SERPLBLD CKD-EPI 2021: 87 ML/MIN/1.73M*2
ERYTHROCYTE [DISTWIDTH] IN BLOOD BY AUTOMATED COUNT: 11.7 % (ref 11.5–14.5)
GLUCOSE SERPL-MCNC: 95 MG/DL (ref 74–99)
HCT VFR BLD AUTO: 43.7 % (ref 41–52)
HGB BLD-MCNC: 14.7 G/DL (ref 13.5–17.5)
MCH RBC QN AUTO: 30.8 PG (ref 26–34)
MCHC RBC AUTO-ENTMCNC: 33.6 G/DL (ref 32–36)
MCV RBC AUTO: 91 FL (ref 80–100)
NRBC BLD-RTO: 0 /100 WBCS (ref 0–0)
PLATELET # BLD AUTO: 248 X10*3/UL (ref 150–450)
POTASSIUM SERPL-SCNC: 4.5 MMOL/L (ref 3.5–5.3)
RBC # BLD AUTO: 4.78 X10*6/UL (ref 4.5–5.9)
SARS-COV-2 RNA RESP QL NAA+PROBE: NOT DETECTED
SODIUM SERPL-SCNC: 138 MMOL/L (ref 136–145)
WBC # BLD AUTO: 8.5 X10*3/UL (ref 4.4–11.3)

## 2024-08-12 PROCEDURE — 87635 SARS-COV-2 COVID-19 AMP PRB: CPT

## 2024-08-12 PROCEDURE — 2500000001 HC RX 250 WO HCPCS SELF ADMINISTERED DRUGS (ALT 637 FOR MEDICARE OP): Performed by: NURSE PRACTITIONER

## 2024-08-12 PROCEDURE — 2500000001 HC RX 250 WO HCPCS SELF ADMINISTERED DRUGS (ALT 637 FOR MEDICARE OP)

## 2024-08-12 PROCEDURE — 96372 THER/PROPH/DIAG INJ SC/IM: CPT

## 2024-08-12 PROCEDURE — 2500000004 HC RX 250 GENERAL PHARMACY W/ HCPCS (ALT 636 FOR OP/ED): Performed by: NURSE PRACTITIONER

## 2024-08-12 PROCEDURE — 80048 BASIC METABOLIC PNL TOTAL CA: CPT | Performed by: NURSE PRACTITIONER

## 2024-08-12 PROCEDURE — 85027 COMPLETE CBC AUTOMATED: CPT | Performed by: NURSE PRACTITIONER

## 2024-08-12 PROCEDURE — 99232 SBSQ HOSP IP/OBS MODERATE 35: CPT | Performed by: SURGERY

## 2024-08-12 PROCEDURE — 2500000004 HC RX 250 GENERAL PHARMACY W/ HCPCS (ALT 636 FOR OP/ED)

## 2024-08-12 PROCEDURE — G0378 HOSPITAL OBSERVATION PER HR: HCPCS

## 2024-08-12 PROCEDURE — 94760 N-INVAS EAR/PLS OXIMETRY 1: CPT

## 2024-08-12 PROCEDURE — 36415 COLL VENOUS BLD VENIPUNCTURE: CPT | Performed by: NURSE PRACTITIONER

## 2024-08-12 RX ORDER — ALUMINUM HYDROXIDE, MAGNESIUM HYDROXIDE, AND SIMETHICONE 1200; 120; 1200 MG/30ML; MG/30ML; MG/30ML
10 SUSPENSION ORAL 4 TIMES DAILY PRN
Status: DISCONTINUED | OUTPATIENT
Start: 2024-08-12 | End: 2024-08-13 | Stop reason: HOSPADM

## 2024-08-12 RX ORDER — CALCIUM CARBONATE 200(500)MG
500 TABLET,CHEWABLE ORAL 4 TIMES DAILY PRN
Status: DISCONTINUED | OUTPATIENT
Start: 2024-08-12 | End: 2024-08-13 | Stop reason: HOSPADM

## 2024-08-12 RX ORDER — PANTOPRAZOLE SODIUM 40 MG/1
40 TABLET, DELAYED RELEASE ORAL
Qty: 30 TABLET | Refills: 1 | Status: CANCELLED | OUTPATIENT
Start: 2024-08-12 | End: 2024-10-11

## 2024-08-12 RX ADMIN — MECLIZINE HYDROCHLORIDE 25 MG: 12.5 TABLET ORAL at 15:40

## 2024-08-12 RX ADMIN — CETIRIZINE HYDROCHLORIDE 10 MG: 10 TABLET, FILM COATED ORAL at 08:05

## 2024-08-12 RX ADMIN — PANTOPRAZOLE SODIUM 40 MG: 40 TABLET, DELAYED RELEASE ORAL at 06:06

## 2024-08-12 RX ADMIN — PANTOPRAZOLE SODIUM 40 MG: 40 TABLET, DELAYED RELEASE ORAL at 15:40

## 2024-08-12 RX ADMIN — MECLIZINE HYDROCHLORIDE 25 MG: 12.5 TABLET ORAL at 21:14

## 2024-08-12 RX ADMIN — FLUTICASONE PROPIONATE 2 SPRAY: 50 SPRAY, METERED NASAL at 08:05

## 2024-08-12 RX ADMIN — ENOXAPARIN SODIUM 40 MG: 40 INJECTION SUBCUTANEOUS at 23:33

## 2024-08-12 RX ADMIN — PROMETHAZINE HYDROCHLORIDE 25 MG: 25 INJECTION INTRAMUSCULAR; INTRAVENOUS at 09:30

## 2024-08-12 RX ADMIN — MECLIZINE HYDROCHLORIDE 25 MG: 12.5 TABLET ORAL at 08:05

## 2024-08-12 ASSESSMENT — PAIN SCALES - GENERAL: PAINLEVEL_OUTOF10: 0 - NO PAIN

## 2024-08-12 ASSESSMENT — ACTIVITIES OF DAILY LIVING (ADL): LACK_OF_TRANSPORTATION: NO

## 2024-08-12 ASSESSMENT — PAIN - FUNCTIONAL ASSESSMENT: PAIN_FUNCTIONAL_ASSESSMENT: 0-10

## 2024-08-12 NOTE — PROGRESS NOTES
"Russel Willis is a 33 y.o. male on day 0 of admission presenting with Vertigo.    Subjective   No further abdominal pain.  Heartburn improved with PPI.  Admits to weight gain of 40 pounds and poor diet.  Still having vertigo       Objective     Physical Exam  Constitutional:       Appearance: Normal appearance.   Abdominal:      Palpations: Abdomen is soft. There is no mass.      Tenderness: There is no abdominal tenderness. There is no guarding.         Last Recorded Vitals  Blood pressure 140/77, pulse 75, temperature 36.7 °C (98.1 °F), temperature source Temporal, resp. rate 16, height 1.727 m (5' 8\"), weight 110 kg (241 lb 10 oz), SpO2 97%.  Intake/Output last 3 Shifts:  I/O last 3 completed shifts:  In: 1909.7 (17.4 mL/kg) [P.O.:600; I.V.:1209.7 (11 mL/kg); IV Piggyback:100]  Out: 1050 (9.6 mL/kg) [Emesis/NG output:1050]  Weight: 109.6 kg     \    Assessment/Plan   Principal Problem:    Vertigo  Active Problems:    Allergic rhinitis    Migraine without aura    Gastroesophageal reflux disease without esophagitis    Plan-continue PPI.  Dietary consult for dietary recommendations.  Consider neurology consult.  No abdominal pathology at this time follow as needed.      James Louis MD      "

## 2024-08-12 NOTE — PROGRESS NOTES
Russel Willis is a 33 y.o. male on day 0 of admission presenting with Vertigo.      Subjective   Pt assessed at bedside, still feeling dizzy and nauseated. Discussed staying another day for ENT consult tomorrow, pt is agreeable.        Objective     Last Recorded Vitals  /77 (BP Location: Right arm, Patient Position: Lying)   Pulse 67   Temp 36.4 °C (97.5 °F) (Temporal)   Resp 16   Wt 110 kg (241 lb 10 oz)   SpO2 96%   Intake/Output last 3 Shifts:    Intake/Output Summary (Last 24 hours) at 8/12/2024 1227  Last data filed at 8/11/2024 1605  Gross per 24 hour   Intake 50 ml   Output --   Net 50 ml       Admission Weight  Weight: 107 kg (235 lb) (08/09/24 1423)    Daily Weight  08/10/24 : 110 kg (241 lb 10 oz)    Image Results  ECG 12 lead  Normal sinus rhythm  Incomplete right bundle branch block  Borderline ECG  When compared with ECG of 04-JAN-2020 06:36,  Vent. rate has decreased BY  33 BPM  Inverted T waves have replaced nonspecific T wave abnormality in Inferior leads      Physical Exam  Vitals reviewed.   Constitutional:       Appearance: He is obese.   HENT:      Head: Normocephalic and atraumatic.      Right Ear: External ear normal.      Left Ear: External ear normal.      Nose: Nose normal.      Mouth/Throat:      Pharynx: Oropharynx is clear.   Eyes:      Conjunctiva/sclera: Conjunctivae normal.   Cardiovascular:      Rate and Rhythm: Normal rate and regular rhythm.      Pulses: Normal pulses.      Heart sounds: Normal heart sounds.   Pulmonary:      Effort: Pulmonary effort is normal.      Breath sounds: Normal breath sounds.   Abdominal:      General: Bowel sounds are normal.      Palpations: Abdomen is soft.   Musculoskeletal:         General: Normal range of motion.      Cervical back: Normal range of motion and neck supple.   Skin:     General: Skin is dry.   Neurological:      General: No focal deficit present.      Mental Status: He is alert and oriented to person, place, and time.    Psychiatric:         Mood and Affect: Mood normal.         Behavior: Behavior normal.         Relevant Results  Scheduled medications  cetirizine, 10 mg, oral, Daily  enoxaparin, 40 mg, subcutaneous, q24h  fluticasone, 2 spray, Each Nostril, Daily  meclizine, 25 mg, oral, TID  pantoprazole, 40 mg, oral, BID AC  scopolamine, 1 patch, transdermal, q72h      Continuous medications  sodium chloride 0.9%, 10 mL/hr      PRN medications  PRN medications: alum-mag hydroxide-simeth, butalbital-acetaminophen-caff, calcium carbonate, ketorolac, melatonin, ondansetron, polyethylene glycol, promethazine, sodium chloride 0.9%    Results for orders placed or performed during the hospital encounter of 08/09/24 (from the past 24 hour(s))   Basic metabolic panel   Result Value Ref Range    Glucose 95 74 - 99 mg/dL    Sodium 138 136 - 145 mmol/L    Potassium 4.5 3.5 - 5.3 mmol/L    Chloride 101 98 - 107 mmol/L    Bicarbonate 32 21 - 32 mmol/L    Anion Gap 10 10 - 20 mmol/L    Urea Nitrogen 12 6 - 23 mg/dL    Creatinine 1.15 0.50 - 1.30 mg/dL    eGFR 87 >60 mL/min/1.73m*2    Calcium 9.6 8.6 - 10.3 mg/dL   CBC   Result Value Ref Range    WBC 8.5 4.4 - 11.3 x10*3/uL    nRBC 0.0 0.0 - 0.0 /100 WBCs    RBC 4.78 4.50 - 5.90 x10*6/uL    Hemoglobin 14.7 13.5 - 17.5 g/dL    Hematocrit 43.7 41.0 - 52.0 %    MCV 91 80 - 100 fL    MCH 30.8 26.0 - 34.0 pg    MCHC 33.6 32.0 - 36.0 g/dL    RDW 11.7 11.5 - 14.5 %    Platelets 248 150 - 450 x10*3/uL   Sars-CoV-2 PCR   Result Value Ref Range    Coronavirus 2019, PCR Not Detected Not Detected          Assessment/Plan        Principal Problem:    Vertigo  Active Problems:    Allergic rhinitis    Migraine without aura    Gastroesophageal reflux disease without esophagitis    #Vertigo  #Dizziness  #Migraine without aura  #BPPV  -CT Brain: No acute intracranial process.  -MRI/MRA: There is no evidence of mass, infarction or hemorrhage.   -Valium 5 mg IV, Atarax 25 mg PO, Antivert 25 mg PO, Zofran 4 mg IV,  and LR 1,000 ml IV given in ED with no effect.  -discontinued Reglan  -continue Fioricet 50 -325 mg q4h, prn, Toradol 15 mg q6h, prn.  -continue meclizine 25 mg q8h scheduled  -Eply maneuver done by Dr. Carnes with some improvement  -outpatient vestibular therapy  -ENT consult, appreciate recs      #Abdominal pain  #Nausea/Vomiting  -CT abd/pelvis: no acute abdominal or pelvic pathology  -continue zofran 4 mg q6h prn  -added phenergan 25 mg q6h prn  -added scopolamine patch q72 h     #Allergic rhinitis  -continue Zyrtec 10 mg PO Daily   -continue Flonase Nasal Daily      #GERD  -increased Pantoprazole 40 mg PO BID    DVT ppx  -lovenox    PUD ppx  -pantoprazole    F: PRN  E: Replete per protocol  N: Regular  A: PIV    Disposition: Pt requires less than 2 inpatient days at this time   Code Status: Full Code        Gianna Khoury, APRN-CNP

## 2024-08-12 NOTE — PROGRESS NOTES
08/12/24 1324   Discharge Planning   Living Arrangements Spouse/significant other  (lives with girlfriend)   Support Systems Parent;Family members;Friends/neighbors   Assistance Needed A&Ox3, independent with ADL's, uses no assistive devices. Works and drives. Patient's PCP is Morena Cordero NP and uses [facility] for pharmacy. Patient will discharge home no needs, will need return to work slip upon discharge, denies the need for services on discharge.   Type of Residence Private residence   Number of Stairs to Enter Residence 0   Number of Stairs Within Residence 0   Do you have animals or pets at home? Yes   Type of Animals or Pets dog   Who is requesting discharge planning? Provider   Home or Post Acute Services None   Expected Discharge Disposition Home   Does the patient need discharge transport arranged? No   Financial Resource Strain   How hard is it for you to pay for the very basics like food, housing, medical care, and heating? Not very   Housing Stability   In the last 12 months, was there a time when you were not able to pay the mortgage or rent on time? N   In the past 12 months, how many times have you moved where you were living? 0   At any time in the past 12 months, were you homeless or living in a shelter (including now)? N   Transportation Needs   In the past 12 months, has lack of transportation kept you from medical appointments or from getting medications? no   In the past 12 months, has lack of transportation kept you from meetings, work, or from getting things needed for daily living? No

## 2024-08-12 NOTE — CARE PLAN
The patient's goals for the shift include minimize dizziness and nausea    The clinical goals for the shift include Patient will remain safe with transfers.    Over the shift, the patient did not make progress toward the following goals. Barriers to progression include increase dizziness. Recommendations to address these barriers include assess patients treatment plan for causes of syncope.

## 2024-08-12 NOTE — CARE PLAN
The patient's goals for the shift include minimize dizziness and nausea    The clinical goals for the shift include Pt will state dizziness will improve throughout shift.    Patient remained safe and stable throughout shift. VS were stable and pt denied pain. Pt c/o dizziness throughout shift and PRN phenergan was administered. Pt tolerated well. Pt will see ENT tomorrow. No other complaints. Pt resting in bed with call light within reach. Family at bedside.

## 2024-08-12 NOTE — CARE PLAN
The patient's goals for the shift include minimize dizziness and nausea    The clinical goals for the shift include Patient will remain safe with transfers.    Over the shift, the patient did make progress toward the following goals.     1930 Assumed care of patient. Patient sleeping, easily awakens. Assessment completed as charted. Patient states when he is lying still, dizziness is not present. Complains of dizziness with movement. Denies nausea at this time. Call light within reach.     2110 Antivert given as ordered. Call light within reach.    2330 Patient resting in bed. Snack given. Denies complaints.

## 2024-08-13 VITALS
TEMPERATURE: 96.8 F | WEIGHT: 241.62 LBS | DIASTOLIC BLOOD PRESSURE: 77 MMHG | HEIGHT: 68 IN | OXYGEN SATURATION: 98 % | BODY MASS INDEX: 36.62 KG/M2 | HEART RATE: 73 BPM | SYSTOLIC BLOOD PRESSURE: 144 MMHG | RESPIRATION RATE: 18 BRPM

## 2024-08-13 PROBLEM — R10.9 ABDOMINAL PAIN: Status: ACTIVE | Noted: 2024-08-13

## 2024-08-13 PROBLEM — J01.90 ACUTE SINUSITIS: Status: RESOLVED | Noted: 2024-08-10 | Resolved: 2024-08-13

## 2024-08-13 PROBLEM — J20.9 ACUTE BRONCHITIS: Status: RESOLVED | Noted: 2024-08-10 | Resolved: 2024-08-13

## 2024-08-13 PROBLEM — R11.2 NAUSEA & VOMITING: Status: ACTIVE | Noted: 2024-08-13

## 2024-08-13 PROBLEM — H81.10 BPPV (BENIGN PAROXYSMAL POSITIONAL VERTIGO): Status: ACTIVE | Noted: 2024-08-13

## 2024-08-13 LAB
ANION GAP SERPL CALC-SCNC: 10 MMOL/L (ref 10–20)
ATRIAL RATE: 66 BPM
BUN SERPL-MCNC: 12 MG/DL (ref 6–23)
CALCIUM SERPL-MCNC: 9.3 MG/DL (ref 8.6–10.3)
CHLORIDE SERPL-SCNC: 103 MMOL/L (ref 98–107)
CO2 SERPL-SCNC: 29 MMOL/L (ref 21–32)
CREAT SERPL-MCNC: 1.09 MG/DL (ref 0.5–1.3)
EGFRCR SERPLBLD CKD-EPI 2021: >90 ML/MIN/1.73M*2
ERYTHROCYTE [DISTWIDTH] IN BLOOD BY AUTOMATED COUNT: 11.6 % (ref 11.5–14.5)
GLUCOSE SERPL-MCNC: 103 MG/DL (ref 74–99)
HCT VFR BLD AUTO: 41.1 % (ref 41–52)
HGB BLD-MCNC: 13.7 G/DL (ref 13.5–17.5)
MCH RBC QN AUTO: 30.4 PG (ref 26–34)
MCHC RBC AUTO-ENTMCNC: 33.3 G/DL (ref 32–36)
MCV RBC AUTO: 91 FL (ref 80–100)
NRBC BLD-RTO: 0 /100 WBCS (ref 0–0)
P AXIS: 32 DEGREES
P OFFSET: 214 MS
P ONSET: 152 MS
PLATELET # BLD AUTO: 243 X10*3/UL (ref 150–450)
POTASSIUM SERPL-SCNC: 4.3 MMOL/L (ref 3.5–5.3)
PR INTERVAL: 140 MS
Q ONSET: 222 MS
QRS COUNT: 11 BEATS
QRS DURATION: 96 MS
QT INTERVAL: 402 MS
QTC CALCULATION(BAZETT): 421 MS
QTC FREDERICIA: 415 MS
R AXIS: 5 DEGREES
RBC # BLD AUTO: 4.5 X10*6/UL (ref 4.5–5.9)
SODIUM SERPL-SCNC: 138 MMOL/L (ref 136–145)
T AXIS: 2 DEGREES
T OFFSET: 423 MS
VENTRICULAR RATE: 66 BPM
WBC # BLD AUTO: 7 X10*3/UL (ref 4.4–11.3)

## 2024-08-13 PROCEDURE — 99221 1ST HOSP IP/OBS SF/LOW 40: CPT | Performed by: OTOLARYNGOLOGY

## 2024-08-13 PROCEDURE — 85027 COMPLETE CBC AUTOMATED: CPT | Performed by: NURSE PRACTITIONER

## 2024-08-13 PROCEDURE — 80048 BASIC METABOLIC PNL TOTAL CA: CPT | Performed by: NURSE PRACTITIONER

## 2024-08-13 PROCEDURE — 2500000004 HC RX 250 GENERAL PHARMACY W/ HCPCS (ALT 636 FOR OP/ED): Performed by: NURSE PRACTITIONER

## 2024-08-13 PROCEDURE — 2500000001 HC RX 250 WO HCPCS SELF ADMINISTERED DRUGS (ALT 637 FOR MEDICARE OP): Performed by: NURSE PRACTITIONER

## 2024-08-13 PROCEDURE — 83036 HEMOGLOBIN GLYCOSYLATED A1C: CPT | Mod: GENLAB | Performed by: NURSE PRACTITIONER

## 2024-08-13 PROCEDURE — 36415 COLL VENOUS BLD VENIPUNCTURE: CPT | Performed by: NURSE PRACTITIONER

## 2024-08-13 PROCEDURE — G0378 HOSPITAL OBSERVATION PER HR: HCPCS

## 2024-08-13 PROCEDURE — 2500000001 HC RX 250 WO HCPCS SELF ADMINISTERED DRUGS (ALT 637 FOR MEDICARE OP)

## 2024-08-13 RX ORDER — PANTOPRAZOLE SODIUM 40 MG/1
40 TABLET, DELAYED RELEASE ORAL 2 TIMES DAILY
Qty: 60 TABLET | Refills: 0 | Status: SHIPPED | OUTPATIENT
Start: 2024-08-13 | End: 2024-09-12

## 2024-08-13 RX ORDER — PREDNISONE 10 MG/1
TABLET ORAL
Qty: 34 TABLET | Refills: 0 | Status: SHIPPED | OUTPATIENT
Start: 2024-08-14 | End: 2024-08-24

## 2024-08-13 RX ORDER — SCOLOPAMINE TRANSDERMAL SYSTEM 1 MG/1
1 PATCH, EXTENDED RELEASE TRANSDERMAL
Qty: 3 PATCH | Refills: 0 | Status: SHIPPED | OUTPATIENT
Start: 2024-08-14 | End: 2024-08-21

## 2024-08-13 RX ORDER — PREDNISONE 20 MG/1
40 TABLET ORAL ONCE
Status: COMPLETED | OUTPATIENT
Start: 2024-08-13 | End: 2024-08-13

## 2024-08-13 RX ORDER — PANTOPRAZOLE SODIUM 40 MG/1
40 TABLET, DELAYED RELEASE ORAL
Qty: 30 TABLET | Refills: 0 | Status: SHIPPED | OUTPATIENT
Start: 2024-08-13 | End: 2024-08-13

## 2024-08-13 RX ORDER — BUTALBITAL, ACETAMINOPHEN AND CAFFEINE 50; 325; 40 MG/1; MG/1; MG/1
2 TABLET ORAL EVERY 12 HOURS PRN
Qty: 6 TABLET | Refills: 0 | Status: SHIPPED | OUTPATIENT
Start: 2024-08-13 | End: 2024-08-16

## 2024-08-13 RX ADMIN — CETIRIZINE HYDROCHLORIDE 10 MG: 10 TABLET, FILM COATED ORAL at 10:03

## 2024-08-13 RX ADMIN — FLUTICASONE PROPIONATE 2 SPRAY: 50 SPRAY, METERED NASAL at 10:03

## 2024-08-13 RX ADMIN — PANTOPRAZOLE SODIUM 40 MG: 40 TABLET, DELAYED RELEASE ORAL at 10:03

## 2024-08-13 RX ADMIN — PREDNISONE 40 MG: 20 TABLET ORAL at 15:28

## 2024-08-13 RX ADMIN — MECLIZINE HYDROCHLORIDE 25 MG: 12.5 TABLET ORAL at 10:03

## 2024-08-13 RX ADMIN — MECLIZINE HYDROCHLORIDE 25 MG: 12.5 TABLET ORAL at 15:28

## 2024-08-13 ASSESSMENT — PAIN SCALES - GENERAL
PAINLEVEL_OUTOF10: 0 - NO PAIN
PAINLEVEL_OUTOF10: 0 - NO PAIN

## 2024-08-13 NOTE — PROGRESS NOTES
"Russel Willis is a 33 y.o. male on day 0 of admission presenting with Vertigo.    Subjective   Pt assessed at bedside, still feels dizzy with movement, headache upon waking that he classifies as \"migraine\". No facial pressure on palpation. ENT consult today and anticipated DC.      Objective   Last Recorded Vitals  /77 (BP Location: Right arm, Patient Position: Lying)   Pulse 72   Temp 35.9 °C (96.7 °F) (Temporal)   Resp 18   Wt 110 kg (241 lb 10 oz)   SpO2 96%   Intake/Output last 3 Shifts:    Intake/Output Summary (Last 24 hours) at 8/13/2024 1323  Last data filed at 8/13/2024 0900  Gross per 24 hour   Intake 480 ml   Output --   Net 480 ml   Admission Weight  Weight: 107 kg (235 lb) (08/09/24 1423)    Daily Weight  08/10/24 : 110 kg (241 lb 10 oz)    Physical Exam  Vitals reviewed.   Constitutional:       Appearance: He is obese.   HENT:      Head: Normocephalic and atraumatic.      Right Ear: External ear normal.      Left Ear: External ear normal.      Nose: Nose normal.      Mouth/Throat:      Pharynx: Oropharynx is clear.   Eyes:      Conjunctiva/sclera: Conjunctivae normal.   Cardiovascular:      Rate and Rhythm: Normal rate and regular rhythm.      Pulses: Normal pulses.      Heart sounds: Normal heart sounds.   Pulmonary:      Effort: Pulmonary effort is normal.      Breath sounds: Normal breath sounds.   Abdominal:      General: Bowel sounds are normal.      Palpations: Abdomen is soft.   Musculoskeletal:         General: Normal range of motion.      Cervical back: Normal range of motion and neck supple.   Skin:     General: Skin is dry.   Neurological:      General: No focal deficit present.      Mental Status: He is alert and oriented to person, place, and time.   Psychiatric:         Mood and Affect: Mood normal.         Behavior: Behavior normal.     Scheduled medications  cetirizine, 10 mg, oral, Daily  enoxaparin, 40 mg, subcutaneous, q24h  fluticasone, 2 spray, Each Nostril, " Daily  meclizine, 25 mg, oral, TID  pantoprazole, 40 mg, oral, BID AC  scopolamine, 1 patch, transdermal, q72h    Continuous medications  sodium chloride 0.9%, 10 mL/hr    PRN medications  PRN medications: alum-mag hydroxide-simeth, butalbital-acetaminophen-caff, calcium carbonate, ketorolac, melatonin, ondansetron, polyethylene glycol, promethazine, sodium chloride 0.9%    Relevant Results:  ECG 12 lead  Result Date: 8/12/2024  Normal sinus rhythm Incomplete right bundle branch block Borderline ECG When compared with ECG of 04-JAN-2020 06:36, Vent. rate has decreased BY  33 BPM Inverted T waves have replaced nonspecific T wave abnormality in Inferior leads    MR brain wo IV contrast  MR angio head wo IV contrast  MR angio neck wo IV contrast  Result Date: 8/11/2024  * There is no evidence of mass, infarction or hemorrhage.   MACRO: none   Signed by: Andreas Whitt 8/11/2024 6:16 PM Dictation workstation:   [id number]    CT abdomen pelvis w IV contrast  Result Date: 8/10/2024  1. No acute abdominal or pelvic pathology. 2. Increase in size right parapelvic cyst. 3. Diffuse colonic diverticula with no diverticulitis. 4. Tiny right hepatic cyst.   MACRO: None   Signed by: Heaven Cobb 8/10/2024 3:04 PM Dictation workstation:   [id number]    CT head wo IV contrast  Result Date: 8/9/2024  No acute intracranial process.       MACRO: None.   Signed by: Mik Maya 8/9/2024 4:24 PM Dictation workstation:   [id number]      Latest Reference Range & Units 08/11/24 10:00 08/12/24 07:41 08/13/24 07:42   GLUCOSE 74 - 99 mg/dL 99 95 103 (H)   SODIUM 136 - 145 mmol/L 138 138 138   POTASSIUM 3.5 - 5.3 mmol/L 4.5 4.5 4.3   CHLORIDE 98 - 107 mmol/L 103 101 103   Bicarbonate 21 - 32 mmol/L 31 32 29   Anion Gap 10 - 20 mmol/L 9 (L) 10 10   Blood Urea Nitrogen 6 - 23 mg/dL 12 12 12   Creatinine 0.50 - 1.30 mg/dL 1.15 1.15 1.09   EGFR >60 mL/min/1.73m*2 87 87 >90   Calcium 8.6 - 10.3 mg/dL 9.4 9.6 9.3   LEUKOCYTES (10*3/UL)  IN BLOOD BY AUTOMATED COUNT, Citizen of [place] 4.4 - 11.3 x10*3/uL 6.6 8.5 7.0   nRBC 0.0 - 0.0 /100 WBCs 0.0 0.0 0.0   ERYTHROCYTES (10*6/UL) IN BLOOD BY AUTOMATED COUNT, Citizen of [place] 4.50 - 5.90 x10*6/uL 4.72 4.78 4.50   HEMOGLOBIN 13.5 - 17.5 g/dL 14.4 14.7 13.7   HEMATOCRIT 41.0 - 52.0 % 42.7 43.7 41.1   MCV 80 - 100 fL 91 91 91   MCH 26.0 - 34.0 pg 30.5 30.8 30.4   MCHC 32.0 - 36.0 g/dL 33.7 33.6 33.3   RED CELL DISTRIBUTION WIDTH 11.5 - 14.5 % 11.7 11.7 11.6   PLATELETS (10*3/UL) IN BLOOD AUTOMATED COUNT, Citizen of [place] 150 - 450 x10*3/uL 229 248 243     Assessment/Plan    Principal Problem:    Dizziness  Active Problems:    Allergic rhinitis    Migraine without aura    Gastroesophageal reflux disease without esophagitis    BPPV (benign paroxysmal positional vertigo)    Abdominal pain    Nausea & vomiting    #Vertigo  #Dizziness  #Migraine without aura  #BPPV  -CT Brain: No acute intracranial process.  -MRI/MRA: There is no evidence of mass, infarction or hemorrhage.   -Valium 5 mg IV, Atarax 25 mg PO, Antivert 25 mg PO, Zofran 4 mg IV, and LR 1,000 ml IV given in ED with no effect.  -discontinued Reglan  -continue Fioricet 50 -325 mg q4h, prn, Toradol 15 mg q6h, prn.  -continue meclizine 25 mg q8h scheduled  -Eply maneuver done by Dr. Carnes with some improvement  -outpatient vestibular therapy  -ENT consult, appreciate recs      #Abdominal pain  #Nausea/Vomiting  -CT abd/pelvis: no acute abdominal or pelvic pathology  -continue zofran 4 mg q6h prn  -added phenergan 25 mg q6h prn  -added scopolamine patch q72 h     #Allergic rhinitis  -continue Zyrtec 10 mg PO Daily   -continue Flonase Nasal Daily      #GERD  -increased Pantoprazole 40 mg PO BID    DVT ppx: lovenox  PUD ppx: pantoprazole  F: PRN  E: Replete per protocol  N: Regular  A: PIV  Disposition: Pt requires less than 2 inpatient days at this time   Code Status: Full Code      Neeta Cardona, APRN-CNP

## 2024-08-13 NOTE — PROGRESS NOTES
08/13/24 1450   Discharge Planning   Assistance Needed Patient being discharged to home with no needs, patient denies the need for services upon discharge. Patient requested follow up appointment be made by this TCC for him to see Dr. Bruno, message left on voicemail at office to call patient to set up.   Who is requesting discharge planning? Provider   Home or Post Acute Services None   Expected Discharge Disposition Home

## 2024-08-13 NOTE — CARE PLAN
The clinical goals for the shift include Patient will remain free from falls/injury this shift.    Over the shift, the patient did remain safe and free from injury. Patient reported dizziness throughout shift, vitals remained stable, no reports of pain. IV removed and discharge instructions reviewed with patient who confirmed understanding. Patient discharged home at 1635.

## 2024-08-13 NOTE — CONSULTS
History Of Present Illness  Russel Willis, is a 33-year-old male; he presented to ER on 08.09.2024, with dizziness which was going on for the past three days. He woke up experiencing vertigo. He describes the vertigo as intermittent, with a sensation of the room spinning and feeling off-balance. He has no prior history of vertigo. The symptoms worsen with head movements or ambulation. The patient denies any changes in vision, but reports headaches in both temples. He also experienced nausea and vomiting, though he has managed to tolerate some oral intake.    He denies any chest pain, shortness of breath, abdominal pain, diarrhea, or constipation. However, he reports tingling throughout his entire body. The patient denies tobacco, alcohol, or street drug use. He is resting in bed and continues to experience vertigo, which worsens with movement.     I examined the patient in his room.  My impression, he has vestibular neuronitis with positional dizziness.  BBQ maneuver was performed.    Recommendations:  1- prednisone 40 mg a day for 7 days, then taped 10 mg daily, stop at 10th day  2- home BBQ maneuver daily  3- follow up in 2 weeks at [facility] office. Please call  to schedule.     Past Medical History  Medical History        Past Medical History:   Diagnosis Date    Anesthesia of skin 06/25/2019     Numbness and tingling    Disorder of penis, unspecified 03/02/2021     Penile lesion    Dysphagia, oropharyngeal phase 09/23/2019     Dysphagia, oropharyngeal phase    Gastro-esophageal reflux disease without esophagitis 11/11/2019     GERD without esophagitis    Localized swelling, mass and lump, neck 08/27/2019     Mass of right side of neck    Other specified anxiety disorders 06/19/2019     Situational anxiety    Otitis media, unspecified, left ear 06/18/2020     Acute left otitis media    Personal history of other diseases of the digestive system 01/12/2021     History of gastroesophageal  reflux (GERD)    Personal history of other diseases of the nervous system and sense organs 06/18/2020     History of otitis media    Personal history of other diseases of the nervous system and sense organs 10/10/2019     History of sleep apnea    Personal history of other diseases of the respiratory system 06/04/2015     History of asthma    Personal history of other diseases of the respiratory system 08/27/2019     History of acute bronchitis    Personal history of other infectious and parasitic diseases 10/16/2019     History of Helicobacter pylori infection    Personal history of other specified conditions 11/09/2020     History of nasal congestion    Personal history of other specified conditions 09/23/2019     History of dysphagia    Personal history of other specified conditions 11/09/2020     History of nasal congestion    Personal history of other specified conditions 06/19/2019     History of shortness of breath    Personal history of other specified conditions 11/11/2019     History of palpitations    Personal history of other specified conditions 09/23/2019     History of chest pain    Shortness of breath 06/17/2019     Exertional shortness of breath    Unspecified otitis externa, left ear 06/18/2020     Left otitis externa    Vertigo              Surgical History  Surgical History         Past Surgical History:   Procedure Laterality Date    NO PAST SURGERIES                Social History  He reports that he has never smoked. He has been exposed to tobacco smoke. He uses smokeless tobacco. He reports current alcohol use. He reports that he does not use drugs.     Family History  Family History          Family History   Problem Relation Name Age of Onset    Osteoarthritis Mother        Other (t2dm) Father                Allergies  Patient has no known allergies.     Review of Systems (copy from int. med H&P)  Constitutional: Negative.    HENT:  Positive for congestion and ear pain.    Eyes: Negative.   "  Respiratory: Negative.     Cardiovascular: Negative.    Gastrointestinal:  Positive for nausea and vomiting.   Endocrine: Negative.    Genitourinary: Negative.    Musculoskeletal: Negative.    Skin: Negative.    Allergic/Immunologic: Negative.    Neurological:  Positive for dizziness.   Hematological: Negative.    Psychiatric/Behavioral: Negative.           Physical Exam    General appearance: Healthy-appearing, well-nourished, well groomed, in no acute distress.     Head and Face: Atraumatic with no masses, lesions, or scarring.      Salivary glands: No tenderness of the parotid glands or parotid masses.     No tenderness of the submandibular glands or submandibular masses.      Facial strength: Normal strength and symmetry, no synkinesis or facial tic.     Eyes: Conjunctivas look non-hyperemic bilaterally    Ears: Not checked today      Nose: No external purulent discharge.     Oral cavity: no visible lesions    Neck: Looks symmetrical.     Pulmonary: Normal respiratory effort.     Neurological/Psychiatric Orientation to person, place, and time: Normal.     Mood and affect: Normal.      Extremities: No clubbing.     Skin: No significant skin lesions were noted at face or neck      Last Recorded Vitals  Blood pressure 140/86, pulse 75, temperature 36 °C (96.8 °F), temperature source Temporal, resp. rate 16, height 1.727 m (5' 8\"), weight 107 kg (235 lb), SpO2 97%.     Relevant Results  Scheduled medications    Scheduled Medications   [START ON 8/10/2024] cetirizine, 10 mg, oral, Daily  enoxaparin, 40 mg, subcutaneous, q24h  [START ON 8/10/2024] fluticasone, 2 spray, Each Nostril, Daily  [START ON 8/10/2024] pantoprazole, 40 mg, oral, Daily before breakfast         Continuous medications    Continuous Medications   sodium chloride 0.9%, 100 mL/hr, Last Rate: 100 mL/hr (08/09/24 2683)         PRN medications  PRN Medications   PRN medications: acetaminophen-caffeine, ketorolac, meclizine, melatonin, metoclopramide, " ondansetron, polyethylene glycol        CT head wo IV contrast     Result Date: 8/9/2024  Interpreted By:  Mik Maya, STUDY: CT HEAD WO IV CONTRAST;  8/9/2024 4:12 pm   INDICATION: Signs/Symptoms:dizzy.   COMPARISON: 06/15/2020.   ACCESSION NUMBER(S): [id number]   ORDERING CLINICIAN: DAMON NEVAREZ   TECHNIQUE: Contiguous unenhanced axial images were obtained through the brain.   FINDINGS: INTRACRANIAL: No acute intracranial bleed, midline shift, or mass effect is seen. Gray-white differentiation is maintained. No extra-axial fluid collection or hydrocephalus.   Bones are intact.   EXTRACRANIAL: Visualized paranasal sinuses and mastoids are clear.        No acute intracranial process.       MACRO: None.   Signed by: Mik Maya 8/9/2024 4:24 PM Dictation workstation:   [id number]            Results for orders placed or performed during the hospital encounter of 08/09/24 (from the past 24 hour(s))   POCT GLUCOSE   Result Value Ref Range     POCT Glucose 101 (H) 74 - 99 mg/dL   CBC and Auto Differential   Result Value Ref Range     WBC 6.7 4.4 - 11.3 x10*3/uL     nRBC 0.0 0.0 - 0.0 /100 WBCs     RBC 4.72 4.50 - 5.90 x10*6/uL     Hemoglobin 14.4 13.5 - 17.5 g/dL     Hematocrit 42.5 41.0 - 52.0 %     MCV 90 80 - 100 fL     MCH 30.5 26.0 - 34.0 pg     MCHC 33.9 32.0 - 36.0 g/dL     RDW 11.7 11.5 - 14.5 %     Platelets 250 150 - 450 x10*3/uL     Neutrophils % 49.0 40.0 - 80.0 %     Immature Granulocytes %, Automated 0.4 0.0 - 0.9 %     Lymphocytes % 39.8 13.0 - 44.0 %     Monocytes % 6.7 2.0 - 10.0 %     Eosinophils % 3.4 0.0 - 6.0 %     Basophils % 0.7 0.0 - 2.0 %     Neutrophils Absolute 3.26 1.20 - 7.70 x10*3/uL     Immature Granulocytes Absolute, Automated 0.03 0.00 - 0.70 x10*3/uL     Lymphocytes Absolute 2.66 1.20 - 4.80 x10*3/uL     Monocytes Absolute 0.45 0.10 - 1.00 x10*3/uL     Eosinophils Absolute 0.23 0.00 - 0.70 x10*3/uL     Basophils Absolute 0.05 0.00 - 0.10 x10*3/uL   Comprehensive  metabolic panel   Result Value Ref Range     Glucose 103 (H) 74 - 99 mg/dL     Sodium 140 136 - 145 mmol/L     Potassium 3.6 3.5 - 5.3 mmol/L     Chloride 102 98 - 107 mmol/L     Bicarbonate 32 21 - 32 mmol/L     Anion Gap 10 10 - 20 mmol/L     Urea Nitrogen 12 6 - 23 mg/dL     Creatinine 1.19 0.50 - 1.30 mg/dL     eGFR 84 >60 mL/min/1.73m*2     Calcium 9.6 8.6 - 10.3 mg/dL     Albumin 4.3 3.4 - 5.0 g/dL     Alkaline Phosphatase 50 33 - 120 U/L     Total Protein 7.4 6.4 - 8.2 g/dL     AST 22 9 - 39 U/L     Bilirubin, Total 0.6 0.0 - 1.2 mg/dL     ALT 44 10 - 52 U/L   Magnesium   Result Value Ref Range     Magnesium 1.76 1.60 - 2.40 mg/dL   Urinalysis with Reflex Culture and Microscopic   Result Value Ref Range     Color, Urine Light-Yellow Light-Yellow, Yellow, Dark-Yellow     Appearance, Urine Clear Clear     Specific Gravity, Urine 1.022 1.005 - 1.035     pH, Urine 6.5 5.0, 5.5, 6.0, 6.5, 7.0, 7.5, 8.0     Protein, Urine NEGATIVE NEGATIVE, 10 (TRACE), 20 (TRACE) mg/dL     Glucose, Urine Normal Normal mg/dL     Blood, Urine 0.03 (TRACE) (A) NEGATIVE     Ketones, Urine NEGATIVE NEGATIVE mg/dL     Bilirubin, Urine NEGATIVE NEGATIVE     Urobilinogen, Urine Normal Normal mg/dL     Nitrite, Urine NEGATIVE NEGATIVE     Leukocyte Esterase, Urine NEGATIVE NEGATIVE   Urinalysis Microscopic   Result Value Ref Range     WBC, Urine 1-5 1-5, NONE /HPF     RBC, Urine 1-2 NONE, 1-2, 3-5 /HPF     Mucus, Urine FEW Reference range not established. /LPF       Dx:  1- vestibular neuronitis  2- Positional dizziness     Assessment/Plan    Russel Willis, is a 33-year-old male; he presented to ER on 08.09.2024, with dizziness which was going on for the past three days. He woke up experiencing vertigo. He describes the vertigo as intermittent, with a sensation of the room spinning and feeling off-balance. He has no prior history of vertigo. The symptoms worsen with head movements or ambulation. The patient denies any changes in  vision, but reports headaches in both temples. He also experienced nausea and vomiting, though he has managed to tolerate some oral intake.    He denies any chest pain, shortness of breath, abdominal pain, diarrhea, or constipation. However, he reports tingling throughout his entire body. The patient denies tobacco, alcohol, or street drug use. He is resting in bed and continues to experience vertigo, which worsens with movement.     I examined the patient in his room.  My impression, he has vestibular neuronitis with positional dizziness.  BBQ maneuver was performed.    Recommendations:  1- prednisone 40 mg a day for 7 days, then taped 10 mg daily, stop at 10th day  2- home BBQ maneuver daily  3- follow up in 2 weeks at [facility] office. Please call  to schedule.

## 2024-08-13 NOTE — DISCHARGE INSTR - OTHER ORDERS
Thank you for choosing [facility] for your Health Care needs.  Also, thank you for allowing us to take you and your families preferences into account when determining your discharge plan.  Stay well!    Your Care Transitions Team Member:Miriam Marshall Robin and [employer] [phone]    For questions about your medications listed on your discharge instructions, please call the Nurses Station at [phone].

## 2024-08-13 NOTE — DISCHARGE SUMMARY
Discharge Diagnosis  Dizziness    Discharge Meds     Your medication list        START taking these medications        Instructions Last Dose Given Next Dose Due   butalbital-acetaminophen-caff -40 mg tablet      Take 2 tablets by mouth every 12 hours if needed for headaches for up to 3 days.       meclizine 25 mg tablet  Commonly known as: Antivert      Take 1 tablet (25 mg) by mouth 3 times a day as needed for dizziness for up to 14 days.       predniSONE 10 mg tablet  Commonly known as: Deltasone  Start taking on: August 14, 2024      Take 4 tablets (40 mg) by mouth once daily for 7 days, THEN 3 tablets (30 mg) once daily for 1 day, THEN 2 tablets (20 mg) once daily for 1 day, THEN 1 tablet (10 mg) once daily for 1 day. Do not fill before August 14, 2024.       scopolamine 1 mg over 3 days patch 3 day  Commonly known as: Transderm-Scop  Start taking on: August 14, 2024      Place 1 patch over 72 hours on the skin every 3rd day for 3 doses.                 Where to Get Your Medications        These medications were sent to [id number] [id number] [place] [id number]      Phone: [phone]   butalbital-acetaminophen-caff -40 mg tablet  meclizine 25 mg tablet  predniSONE 10 mg tablet  scopolamine 1 mg over 3 days patch 3 day         Test Results Pending At Discharge  Pending Labs       Order Current Status    Hemoglobin A1c In process            Hospital Course   Russel Willis is a 33 y.o. male with PMH of GERD, anxiety, otitis media, SARAH and vertigo who was admitted with dizziness, n/v, abdominal pain. He was seen and treated by Medicine team, Cardiology for dizziness, Surgery for severe heartburn and abdominal pain, and ENT for dizziness and positional vertigo.   He was started on prednisone prior to discharge and will continue a 10 days course and follow up with Dr Bruno in 2 weeks. He also has Rx for meclizine, PPI, scopolamine and  migraine meds sent to his pharmacy.     Problem List:  #Vertigo  #Dizziness  #Migraine without aura  #BPPV  -CT Brain: No acute intracranial process.  -MRI/MRA: There is no evidence of mass, infarction or hemorrhage.   -Valium 5 mg IV, Atarax 25 mg PO, Antivert 25 mg PO, Zofran 4 mg IV, and LR 1,000 ml IV given in ED with no effect.  -discontinued Reglan  -continue Fioricet 50 -325 mg q4h, prn, Toradol 15 mg q6h, prn.  -continue meclizine 25 mg q8h scheduled  -Eply maneuver done by Dr. Carnes with some improvement  -outpatient vestibular therapy  -ENT consult, appreciate recs   ---prednisone started x 10 days  ---follow up with ENT in 2 weeks  ---BBQ maneuver daily     #Abdominal pain  #Nausea/Vomiting  -CT abd/pelvis: no acute abdominal or pelvic pathology  -continue zofran 4 mg q6h prn  -added phenergan 25 mg q6h prn  -added scopolamine patch q72 h  ---continue scopolamine as needed  ---Follow up as needed with PCP     #Allergic rhinitis  -continue Zyrtec 10 mg PO Daily   -continue Flonase Nasal Daily   ---Continue all chronic meds  ---Follow up as needed with PCP     #GERD  -increased Pantoprazole 40 mg PO BID  ---Continue PPI  ---Follow up as needed with PCP    Pertinent Physical Exam At Time of Discharge  Physical Exam  Constitutional:       Appearance: He is obese.   HENT:      Head: Normocephalic and atraumatic.      Right Ear: External ear normal.      Left Ear: External ear normal.      Nose: Nose normal.      Mouth/Throat:      Pharynx: Oropharynx is clear.   Eyes:      Conjunctiva/sclera: Conjunctivae normal.   Cardiovascular:      Rate and Rhythm: Normal rate and regular rhythm.      Pulses: Normal pulses.      Heart sounds: Normal heart sounds.   Pulmonary:      Effort: Pulmonary effort is normal.      Breath sounds: Normal breath sounds.   Abdominal:      General: Bowel sounds are normal.      Palpations: Abdomen is soft.   Musculoskeletal:         General: Normal range of motion.      Cervical back:  Normal range of motion and neck supple.   Skin:     General: Skin is dry.   Neurological:      General: No focal deficit present.      Mental Status: He is alert and oriented to person, place, and time.   Psychiatric:         Mood and Affect: Mood normal.         Behavior: Behavior normal.     Stable for discharge.  Total cumulative time spent in preparation of this discharge including documentation review, coordination of care with the medical team including PT/SW/care coordinators and treating consultants, discussion with patient and pertinent family members and finalization of prescriptions, follow-up appointments, and this discharge summary was approximately 45 minutes.    Outpatient Follow-Up  Future Appointments   Date Time Provider Department [place]   8/26/2024  3:00 PM James Bruno MD [id number]-CNP

## 2024-08-14 ENCOUNTER — PATIENT OUTREACH (OUTPATIENT)
Dept: PRIMARY CARE | Facility: CLINIC | Age: 34
End: 2024-08-14
Payer: COMMERCIAL

## 2024-08-14 DIAGNOSIS — R42 DIZZINESS: ICD-10-CM

## 2024-08-14 DIAGNOSIS — Z09 HOSPITAL DISCHARGE FOLLOW-UP: ICD-10-CM

## 2024-08-14 LAB
EST. AVERAGE GLUCOSE BLD GHB EST-MCNC: 80 MG/DL
HBA1C MFR BLD: 4.4 %

## 2024-08-14 NOTE — PROGRESS NOTES
Discharge Facility: [facility]  Discharge Diagnosis: Dizziness, BPPV- Vertigo.   Admission Date: 8/9/24  Discharge Date: 8/13/24    PCP Appointment Date: TBD- pt requests to call back to schedule  Specialist Appointment Date:  ENT- 8/26/24  Hospital Encounter and Summary Linked: Yes  See discharge assessment below for further details    Engagement  Call Start Time: 1529 (8/14/2024  3:36 PM)    Medications  Medications reviewed with patient/caregiver?: Yes (8/14/2024  3:36 PM)  Is the patient having any side effects they believe may be caused by any medication additions or changes?: No (8/14/2024  3:36 PM)  Does the patient have all medications ordered at discharge?: Yes (8/14/2024  3:36 PM)  Care Management Interventions: No intervention needed; Provided patient education (8/14/2024  3:36 PM)  Prescription Comments: Start: Butalbital-acetaminophen-caff -40 mg tablet,  Meclizine 25 mg tablet,  PredniSONE 10 mg tablet,  Scopolamine 1 mg over 3 days patch. (8/14/2024  3:36 PM)  Is the patient taking all medications as directed (includes completed medication regime)?: Yes (8/14/2024  3:36 PM)  Care Management Interventions: Provided patient education (8/14/2024  3:36 PM)  Medication Comments: See medication list (8/14/2024  3:36 PM)    Appointments  Does the patient have a primary care provider?: Yes (8/14/2024  3:36 PM)  Care Management Interventions: Educated patient on importance of making appointment; Advised patient to make appointment (8/14/2024  3:36 PM)  Has the patient kept scheduled appointments due by today?: Not applicable (8/14/2024  3:36 PM)  Care Management Interventions: Educated on importance of keeping appointment (8/14/2024  3:36 PM)    Self Management  What is the home health agency?: n/a (8/14/2024  3:36 PM)  Has home health visited the patient within 72 hours of discharge?: Not applicable (8/14/2024  3:36 PM)  What Durable Medical Equipment (DME) was ordered?: n/a (8/14/2024  3:36  PM)    Patient Teaching  Does the patient have access to their discharge instructions?: Yes (8/14/2024  3:36 PM)  Care Management Interventions: Reviewed instructions with patient; Educated on MyChart (8/14/2024  3:36 PM)  What is the patient's perception of their health status since discharge?: Same (8/14/2024  3:36 PM)  Is the patient/caregiver able to teach back the hierarchy of who to call/visit for symptoms/problems? PCP, Specialist, Home Health nurse, Urgent Care, ED, 911: Yes (8/14/2024  3:36 PM)    Wrap Up  Wrap Up Additional Comments: TCM initial outreach post discharge completed successfully. Patient states he is home and still feeling dizzy, but okay at this time. Patient confirmed receiving all medications post discharge, excpet Prednisone which was not ready until today. Patient declined to schedule his PCP follow up appt during call today, stating he would prefer to call back at a later date/time when his dizziness is better. Patient denied any needs, questions or concerns for TCM, but was encouraged to call back if needed. TCM phone number provided. (8/14/2024  3:36 PM)  Call End Time: 1533 (8/14/2024  3:36 PM)

## 2024-08-26 ENCOUNTER — APPOINTMENT (OUTPATIENT)
Dept: OTOLARYNGOLOGY | Facility: CLINIC | Age: 34
End: 2024-08-26
Payer: COMMERCIAL

## 2024-08-26 ENCOUNTER — LAB (OUTPATIENT)
Dept: LAB | Facility: LAB | Age: 34
End: 2024-08-26
Payer: COMMERCIAL

## 2024-08-26 DIAGNOSIS — J34.2 DEVIATED NASAL SEPTUM: ICD-10-CM

## 2024-08-26 DIAGNOSIS — M26.609 TEMPOROMANDIBULAR DISORDER: ICD-10-CM

## 2024-08-26 DIAGNOSIS — J30.9 ALLERGIC RHINITIS, UNSPECIFIED SEASONALITY, UNSPECIFIED TRIGGER: Primary | ICD-10-CM

## 2024-08-26 DIAGNOSIS — J34.3 NASAL TURBINATE HYPERTROPHY: ICD-10-CM

## 2024-08-26 DIAGNOSIS — J30.9 ALLERGIC RHINITIS, UNSPECIFIED SEASONALITY, UNSPECIFIED TRIGGER: ICD-10-CM

## 2024-08-26 DIAGNOSIS — H81.12 BENIGN PAROXYSMAL POSITIONAL VERTIGO OF LEFT EAR: ICD-10-CM

## 2024-08-26 PROCEDURE — 99214 OFFICE O/P EST MOD 30 MIN: CPT | Performed by: OTOLARYNGOLOGY

## 2024-08-26 PROCEDURE — 86003 ALLG SPEC IGE CRUDE XTRC EA: CPT

## 2024-08-26 PROCEDURE — 82785 ASSAY OF IGE: CPT

## 2024-08-26 PROCEDURE — 36415 COLL VENOUS BLD VENIPUNCTURE: CPT

## 2024-08-26 RX ORDER — LORATADINE 10 MG/1
10 TABLET ORAL DAILY PRN
Qty: 30 TABLET | Refills: 2 | Status: SHIPPED | OUTPATIENT
Start: 2024-08-26 | End: 2024-11-24

## 2024-08-26 RX ORDER — MONTELUKAST SODIUM 10 MG/1
10 TABLET ORAL DAILY
Qty: 30 TABLET | Refills: 2 | Status: SHIPPED | OUTPATIENT
Start: 2024-08-26 | End: 2024-11-24

## 2024-08-26 RX ORDER — FLUTICASONE PROPIONATE 50 MCG
1 SPRAY, SUSPENSION (ML) NASAL 2 TIMES DAILY
Qty: 16 G | Refills: 11 | Status: SHIPPED | OUTPATIENT
Start: 2024-08-26 | End: 2025-08-26

## 2024-08-26 NOTE — PROGRESS NOTES
"History Of Present Illness  Russel Willis is a 34 y.o. male presenting with: \"Checkup from hospital visit-inner ear infection\". He is kindly referred by JESSICA Mott.      He has chronic difficulty with breathing out of his nose. At times his ears get clogged.  Recently, he had vertigo dizziness and was treated for vestibular neuritis.  [place] Hallpike shows mild dizziness at left.     On examination, nasal septum is deviated to right. Left inferior turbinate is hypertrophic.    Plan:  1- allergy test  2- fluticasone, claritin and montelukast  3- follow up in one month      Past Medical History  He has a past medical history of Anesthesia of skin (06/25/2019), Disorder of penis, unspecified (03/02/2021), Dysphagia, oropharyngeal phase (09/23/2019), Gastro-esophageal reflux disease without esophagitis (11/11/2019), Localized swelling, mass and lump, neck (08/27/2019), Other specified anxiety disorders (06/19/2019), Otitis media, unspecified, left ear (06/18/2020), Personal history of other diseases of the digestive system (01/12/2021), Personal history of other diseases of the nervous system and sense organs (06/18/2020), Personal history of other diseases of the nervous system and sense organs (10/10/2019), Personal history of other diseases of the respiratory system (06/04/2015), Personal history of other diseases of the respiratory system (08/27/2019), Personal history of other infectious and parasitic diseases (10/16/2019), Personal history of other specified conditions (11/09/2020), Personal history of other specified conditions (09/23/2019), Personal history of other specified conditions (11/09/2020), Personal history of other specified conditions (06/19/2019), Personal history of other specified conditions (11/11/2019), Personal history of other specified conditions (09/23/2019), Shortness of breath (06/17/2019), Unspecified otitis externa, left ear (06/18/2020), and Vertigo.    Surgical " "History  He has a past surgical history that includes No past surgeries.     Social History  He reports that he has never smoked. He has been exposed to tobacco smoke. He uses smokeless tobacco. He reports current alcohol use. He reports that he does not use drugs.    Family History  Family History   Problem Relation Name Age of Onset    Osteoarthritis Mother      Other (t2dm) Father          Allergies  Patient has no known allergies.    Review of Systems   None reported     Physical Exam    General appearance: Healthy-appearing, well-nourished, well groomed, in no acute distress.     Head and Face: Atraumatic with no masses, lesions, or scarring.      Salivary glands: No tenderness of the parotid glands or parotid masses.     No tenderness of the submandibular glands or submandibular masses.      Facial strength: Normal strength and symmetry, no synkinesis or facial tic.     Eyes: Conjunctivas look non-hyperemic bilaterally    Ears: Bilaterally ear canals look normal. Tympanic membranes look intact, no hyperemia, fluid or retraction. Hearing grossly normal.      Nose: Septum deviated to right. Left inferior turbinate is hypertrophic.     Oral Cavity/Mouth: Lips and tongue look normal.     Throat: No postnasal discharge. No tonsil hypertrophy. No hyperemia.    Neck: Symmetrical, trachea midline.     Pulmonary: Normal respiratory effort.     Lymphatic: No palpable pathologic lymph nodes at neck.     Neurological/Psychiatric Orientation to person, place, and time: Normal.     Mood and affect: Normal.      Extremities: No clubbing.     Skin: No significant skin lesions were noted at face or neck        Procedure         Last Recorded Vitals  There were no vitals taken for this visit.    Relevant Results    Assessment and Plan:  Russel Willis is a 34 y.o. male presenting with: \"Checkup from hospital visit-inner ear infection\". He is kindly referred by JESSICA Mott.      He has chronic difficulty with " breathing out of his nose. At times his ears get clogged.  Recently, he had vertigo dizziness and was treated for vestibular neuritis.  [place] Hallpike shows mild dizziness at left.     On examination, nasal septum is deviated to right. Left inferior turbinate is hypertrophic.  Tenderness at TMJ bilaterally.    Plan:  1- allergy test  2- fluticasone, claritin and montelukast  3- follow up in one month      [facility]  Otolaryngology - Head & Neck Surgery

## 2024-08-28 LAB
A ALTERNATA IGE QN: 4.29 KU/L
A FUMIGATUS IGE QN: 0.22 KU/L
BERMUDA GRASS IGE QN: 2.32 KU/L
BOXELDER IGE QN: 3.76 KU/L
C HERBARUM IGE QN: <0.1 KU/L
CALIF WALNUT POLN IGE QN: <0.1 KU/L
CAT DANDER IGE QN: <0.1 KU/L
CMN PIGWEED IGE QN: <0.1 KU/L
COMMON RAGWEED IGE QN: 1.21 KU/L
COTTONWOOD IGE QN: <0.1 KU/L
D FARINAE IGE QN: 0.59 KU/L
D PTERONYSS IGE QN: 0.61 KU/L
DOG DANDER IGE QN: 0.57 KU/L
ENGL PLANTAIN IGE QN: 0.16 KU/L
GOOSEFOOT IGE QN: 0.12 KU/L
JOHNSON GRASS IGE QN: 2.65 KU/L
KENT BLUE GRASS IGE QN: 19 KU/L
LONDON PLANE IGE QN: <0.1 KU/L
MT JUNIPER IGE QN: <0.1 KU/L
P NOTATUM IGE QN: <0.1 KU/L
PECAN/HICK TREE IGE QN: <0.1 KU/L
ROACH IGE QN: 0.46 KU/L
SALTWORT IGE QN: <0.1 KU/L
SHEEP SORREL IGE QN: <0.1 KU/L
SILVER BIRCH IGE QN: <0.1 KU/L
TIMOTHY IGE QN: 13.9 KU/L
TOTAL IGE SMQN RAST: 244 KU/L
WHITE ASH IGE QN: <0.1 KU/L
WHITE ELM IGE QN: <0.1 KU/L
WHITE MULBERRY IGE QN: <0.1 KU/L
WHITE OAK IGE QN: <0.1 KU/L

## 2024-08-30 ENCOUNTER — PATIENT OUTREACH (OUTPATIENT)
Dept: PRIMARY CARE | Facility: CLINIC | Age: 34
End: 2024-08-30
Payer: COMMERCIAL

## 2024-08-30 DIAGNOSIS — Z09 HOSPITAL DISCHARGE FOLLOW-UP: ICD-10-CM

## 2024-08-30 NOTE — PROGRESS NOTES
Called patient to assess for any further needs they may have and to obtain an update on the patients status 14 days post discharge from the hospital. Patient did not follow up with their PCP within that time. Patient states they have no questions or concerns and are still recovering well post hospitalization. Patient denied any new or worsening symptoms to report. Dizziness has resolved per patient and he continues to follow up with ENT as directed. Patient was encouraged to call TCM with any needs or questions that may arise and to follow up with their PCP with any changes in status. TCM to follow up 30 days post hospitalization to reassess needs.

## 2024-09-17 ENCOUNTER — PATIENT OUTREACH (OUTPATIENT)
Dept: PRIMARY CARE | Facility: CLINIC | Age: 34
End: 2024-09-17
Payer: COMMERCIAL

## 2024-09-17 DIAGNOSIS — Z09 HOSPITAL DISCHARGE FOLLOW-UP: ICD-10-CM

## 2024-09-17 NOTE — PROGRESS NOTES
Successful outreach to patient regarding hospitalization as patient continues TCM program. At time of outreach call the patient feels as if their condition has improved since initial visit with PCP or specialist.  Questions or concerns addressed at this time with patient.   Provided contact information to patient if any further non-emergent needs arise.  Patient states he is doing very well, with no acute changes or concerns to report at time of call. Patient denied any needs, questions or concerns from TCM. Patient has met target of no readmission for 30 days post hospital discharge and is graduated from Transitional Care Management program at this time.

## 2024-09-30 ENCOUNTER — APPOINTMENT (OUTPATIENT)
Dept: OTOLARYNGOLOGY | Facility: CLINIC | Age: 34
End: 2024-09-30
Payer: COMMERCIAL

## 2025-02-17 ENCOUNTER — HOSPITAL ENCOUNTER (EMERGENCY)
Facility: HOSPITAL | Age: 35
Discharge: HOME | End: 2025-02-17
Attending: EMERGENCY MEDICINE
Payer: COMMERCIAL

## 2025-02-17 VITALS
WEIGHT: 236 LBS | RESPIRATION RATE: 18 BRPM | BODY MASS INDEX: 35.77 KG/M2 | SYSTOLIC BLOOD PRESSURE: 148 MMHG | TEMPERATURE: 97 F | DIASTOLIC BLOOD PRESSURE: 71 MMHG | OXYGEN SATURATION: 97 % | HEIGHT: 68 IN | HEART RATE: 93 BPM

## 2025-02-17 DIAGNOSIS — M72.2 PLANTAR FASCIITIS OF RIGHT FOOT: Primary | ICD-10-CM

## 2025-02-17 PROCEDURE — 99283 EMERGENCY DEPT VISIT LOW MDM: CPT

## 2025-02-17 PROCEDURE — 99281 EMR DPT VST MAYX REQ PHY/QHP: CPT | Performed by: EMERGENCY MEDICINE

## 2025-02-17 ASSESSMENT — PAIN SCALES - GENERAL: PAINLEVEL_OUTOF10: 7

## 2025-02-17 ASSESSMENT — COLUMBIA-SUICIDE SEVERITY RATING SCALE - C-SSRS
2. HAVE YOU ACTUALLY HAD ANY THOUGHTS OF KILLING YOURSELF?: NO
1. IN THE PAST MONTH, HAVE YOU WISHED YOU WERE DEAD OR WISHED YOU COULD GO TO SLEEP AND NOT WAKE UP?: NO
6. HAVE YOU EVER DONE ANYTHING, STARTED TO DO ANYTHING, OR PREPARED TO DO ANYTHING TO END YOUR LIFE?: NO

## 2025-02-17 ASSESSMENT — PAIN - FUNCTIONAL ASSESSMENT: PAIN_FUNCTIONAL_ASSESSMENT: 0-10

## 2025-02-17 ASSESSMENT — PAIN DESCRIPTION - PAIN TYPE: TYPE: ACUTE PAIN

## 2025-02-17 ASSESSMENT — PAIN DESCRIPTION - LOCATION: LOCATION: FOOT

## 2025-02-17 NOTE — ED PROVIDER NOTES
"Department of Emergency Medicine   ED  Provider Note  Admit Date/RoomTime: 2/17/2025  9:44 AM  ED Room: CHAIR01/CHAIR01                  History of Present Illness:   Russel Willis is a 34 y.o. male presenting to the ED for right foot pain, beginning x 1 week.  The complaint has been persistent, moderate in severity, and worsened by walking.  Patient complains of right foot pain is worse with weightbearing.  No history of trauma or injury.  No fever or chills.  Thinks he has plantar fasciitis.  Has an appoint with podiatry tomorrow.  He is requesting a \"shoe\" for walking.      Review of Systems:   Pertinent positives and review of systems as noted above.  Remaining 10 review of systems is negative or noncontributory to today's episode of care.        --------------------------------------------- PAST HISTORY ---------------------------------------------  Past Medical History:  has a past medical history of Anesthesia of skin (06/25/2019), Disorder of penis, unspecified (03/02/2021), Dysphagia, oropharyngeal phase (09/23/2019), Gastro-esophageal reflux disease without esophagitis (11/11/2019), Localized swelling, mass and lump, neck (08/27/2019), Other specified anxiety disorders (06/19/2019), Otitis media, unspecified, left ear (06/18/2020), Personal history of other diseases of the digestive system (01/12/2021), Personal history of other diseases of the nervous system and sense organs (06/18/2020), Personal history of other diseases of the nervous system and sense organs (10/10/2019), Personal history of other diseases of the respiratory system (06/04/2015), Personal history of other diseases of the respiratory system (08/27/2019), Personal history of other infectious and parasitic diseases (10/16/2019), Personal history of other specified conditions (11/09/2020), Personal history of other specified conditions (09/23/2019), Personal history of other specified conditions (11/09/2020), Personal history of other " specified conditions (06/19/2019), Personal history of other specified conditions (11/11/2019), Personal history of other specified conditions (09/23/2019), Shortness of breath (06/17/2019), Unspecified otitis externa, left ear (06/18/2020), and Vertigo.    Past Surgical History:  has a past surgical history that includes No past surgeries.    Social History:  reports that he has never smoked. He has been exposed to tobacco smoke. He uses smokeless tobacco. He reports current alcohol use. He reports that he does not use drugs.    Family History: family history includes Osteoarthritis in his mother; t2dm in his father. Unless otherwise noted, family history is non contributory    Patient's Medications   New Prescriptions    No medications on file   Previous Medications    FLUTICASONE (FLONASE) 50 MCG/ACTUATION NASAL SPRAY    Administer 1 spray into each nostril 2 times a day. Shake gently. Before first use, prime pump. After use, clean tip and replace cap.    LORATADINE (CLARITIN) 10 MG TABLET    Take 1 tablet (10 mg) by mouth once daily as needed for allergies.    MONTELUKAST (SINGULAIR) 10 MG TABLET    Take 1 tablet (10 mg) by mouth once daily.    PANTOPRAZOLE (PROTONIX) 40 MG EC TABLET    Take 1 tablet (40 mg) by mouth 2 times a day. Do not crush, chew, or split.   Modified Medications    No medications on file   Discontinued Medications    No medications on file      The patient’s home medications have been reviewed.    Allergies: Patient has no known allergies.    -------------------------------------------------- RESULTS -------------------------------------------------  All laboratory and radiology results have been personally reviewed by myself   LABS:  Labs Reviewed - No data to display      RADIOLOGY:  Interpreted by Radiologist.  No orders to display       No results found for this or any previous visit (from the past 4464 hours).  ------------------------- NURSING NOTES AND VITALS REVIEWED  "---------------------------   The nursing notes within the ED encounter and vital signs as below have been reviewed.   /71   Pulse 93   Temp 36.1 °C (97 °F) (Temporal)   Resp 18   Ht 1.727 m (5' 8\")   Wt 107 kg (236 lb)   SpO2 97%   BMI 35.88 kg/m²   Oxygen Saturation Interpretation: Normal      ---------------------------------------------------PHYSICAL EXAM--------------------------------------  Physical Exam  Constitutional:       General: He is not in acute distress.     Appearance: Normal appearance. He is not ill-appearing or toxic-appearing.   HENT:      Nose: Nose normal.      Mouth/Throat:      Mouth: Mucous membranes are moist.      Pharynx: Oropharynx is clear.   Eyes:      General: No scleral icterus.     Pupils: Pupils are equal, round, and reactive to light.   Cardiovascular:      Rate and Rhythm: Normal rate and regular rhythm.      Heart sounds: Normal heart sounds.   Pulmonary:      Effort: Pulmonary effort is normal. No respiratory distress.      Breath sounds: No wheezing or rhonchi.   Musculoskeletal:         General: Tenderness present. No swelling, deformity or signs of injury.      Cervical back: Normal range of motion.      Right lower leg: No edema.      Left lower leg: No edema.      Comments: Tenderness on palpation of the plantar fascia.  The foot is neurovascularly intact.  There is no ecchymosis or significant swelling.            Procedures  None  ------------------------------ ED COURSE/MEDICAL DECISION MAKING----------------------    Medical Decision Making:   Was seen and examined by me    Patient was offered x-ray and declines  Patient wants a walking boot.  Given a postop shoe.  He has an appointment podiatry tomorrow.  I explained he should take ibuprofen.  States he has ibuprofen at home.  I recommended 600 mg 3 times a day as needed.  Follow-up with podiatry tomorrow.    Diagnoses as of 02/17/25 0956   Plantar fasciitis of right foot      Counseling:   The " emergency provider has spoken with the patient and discussed today’s results, in addition to providing specific details for the plan of care and counseling regarding the diagnosis and prognosis.  Questions are answered at this time and they are agreeable with the plan.      --------------------------------- IMPRESSION AND DISPOSITION ---------------------------------        IMPRESSION  1. Plantar fasciitis of right foot        DISPOSITION  Disposition: Discharge to home  Patient condition is fair      Billing Provider Critical Care Time: 0 minutes     David Gould,   02/17/25 0953       David Gould,   02/17/25 0956

## 2025-02-17 NOTE — ED TRIAGE NOTES
Pt ambulatory to ED c/o R foot pain, pt states he has flat feet and wears shoes with arch support but his foot has been hurting for a week, pt  denies any injury

## 2025-02-18 ENCOUNTER — OFFICE VISIT (OUTPATIENT)
Dept: PODIATRY | Facility: CLINIC | Age: 35
End: 2025-02-18
Payer: COMMERCIAL

## 2025-02-18 DIAGNOSIS — M19.079 1ST MTP ARTHRITIS: ICD-10-CM

## 2025-02-18 DIAGNOSIS — M76.829 PTTD (POSTERIOR TIBIAL TENDON DYSFUNCTION): ICD-10-CM

## 2025-02-18 DIAGNOSIS — M79.671 FOOT PAIN, BILATERAL: Primary | ICD-10-CM

## 2025-02-18 DIAGNOSIS — M21.42 PES PLANUS OF BOTH FEET: ICD-10-CM

## 2025-02-18 DIAGNOSIS — M21.41 PES PLANUS OF BOTH FEET: ICD-10-CM

## 2025-02-18 DIAGNOSIS — M79.672 FOOT PAIN, BILATERAL: Primary | ICD-10-CM

## 2025-02-18 PROCEDURE — 99204 OFFICE O/P NEW MOD 45 MIN: CPT | Performed by: PODIATRIST

## 2025-02-18 RX ORDER — METHYLPREDNISOLONE 4 MG/1
TABLET ORAL
Qty: 21 TABLET | Refills: 0 | Status: SHIPPED | OUTPATIENT
Start: 2025-02-18 | End: 2025-02-24

## 2025-02-18 RX ORDER — IBUPROFEN 800 MG/1
800 TABLET ORAL 3 TIMES DAILY
Qty: 90 TABLET | Refills: 0 | Status: SHIPPED | OUTPATIENT
Start: 2025-02-18 | End: 2025-03-20

## 2025-02-18 NOTE — PROGRESS NOTES
History of Present Illness:   Patient states they are here for foot pain  Most bothersome when walking  Denies trauma to area  Hurts throughout day  Denies being DM  No improvement noted  Looking for relief  NO other pedal complaints at this time      Past Medical History  Past Medical History:   Diagnosis Date    Anesthesia of skin 06/25/2019    Numbness and tingling    Disorder of penis, unspecified 03/02/2021    Penile lesion    Dysphagia, oropharyngeal phase 09/23/2019    Dysphagia, oropharyngeal phase    Gastro-esophageal reflux disease without esophagitis 11/11/2019    GERD without esophagitis    Localized swelling, mass and lump, neck 08/27/2019    Mass of right side of neck    Other specified anxiety disorders 06/19/2019    Situational anxiety    Otitis media, unspecified, left ear 06/18/2020    Acute left otitis media    Personal history of other diseases of the digestive system 01/12/2021    History of gastroesophageal reflux (GERD)    Personal history of other diseases of the nervous system and sense organs 06/18/2020    History of otitis media    Personal history of other diseases of the nervous system and sense organs 10/10/2019    History of sleep apnea    Personal history of other diseases of the respiratory system 06/04/2015    History of asthma    Personal history of other diseases of the respiratory system 08/27/2019    History of acute bronchitis    Personal history of other infectious and parasitic diseases 10/16/2019    History of Helicobacter pylori infection    Personal history of other specified conditions 11/09/2020    History of nasal congestion    Personal history of other specified conditions 09/23/2019    History of dysphagia    Personal history of other specified conditions 11/09/2020    History of nasal congestion    Personal history of other specified conditions 06/19/2019    History of shortness of breath    Personal history of other specified conditions 11/11/2019    History of  palpitations    Personal history of other specified conditions 09/23/2019    History of chest pain    Shortness of breath 06/17/2019    Exertional shortness of breath    Unspecified otitis externa, left ear 06/18/2020    Left otitis externa    Vertigo        Medications and Allergies have been reviewed.    Review Of Systems:  GENERAL: No weight loss, malaise or fevers.  HEENT: Negative for frequent or significant headaches,   RESPIRATORY: Negative for cough, wheezing or shortness of breath.  CARDIOVASCULAR: Negative for chest pain, leg swelling or palpitations.    Examination of Both Lower Extremities:   Objective:   Vasc: DP and PT pulses are palpable bilateral.  CFT is less than 3 seconds bilateral.  Skin temperature is warm to cool proximal to distal bilateral.      Neuro: Vibratory, light touch and proprioception are intact bilateral.      Derm: Nails 1-5 bilateral are intact.  Skin is supple with normal texture and turgor noted.  Webspaces are clean, dry and intact bilateral.  There are no hyperkeratoses, ulcerations, verruca or other lesions noted.      Ortho: Muscle strength is 5/5 for all pedal groups tested. Pes planus foot type noted. Pain along right post tib tendon. Mild edema noted. No ecchymosis noted.     1. 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 Pes planus of both feet  Custom Orthotics    methylPREDNISolone (Medrol Dospak) 4 mg tablets    ibuprofen 800 mg tablet    Referral to Orthopaedic Surgery    Referral to Physical Therapy      4. PTTD (posterior tibial tendon dysfunction)  Custom Orthotics    methylPREDNISolone (Medrol Dospak) 4 mg tablets    ibuprofen 800  mg tablet    Referral to Orthopaedic Surgery    Referral to Physical Therapy          Patient exam and eval  Discussed arthritic changes in feet, flat feet/ tendon pain  Discussed causes, symptoms, aggravating factors and treatment options  Recommend stiff supportive shoe gear  Shoes that do not twist or bend  Minimize walking barefoot  Discussed 80/20 rule  Discussed ice, nsaids, lauren payne/biofreeze  Called in mdp, then ibu  Referred to PT  Pt questioned surgery for 1st mTPJ arthritis, sent to ortho foot [facility] ankle  Gave rx for custom insert and list of vendors  Patient to follow up if no improvement noted.   Pt in agreement to plan  All questions answered          Flavia Sanchez DPM  [phone]  Option 2  Fax: [phone]

## 2025-03-14 ENCOUNTER — TELEPHONE (OUTPATIENT)
Dept: PHYSICAL THERAPY | Facility: HOSPITAL | Age: 35
End: 2025-03-14
Payer: COMMERCIAL

## 2025-03-14 NOTE — TELEPHONE ENCOUNTER
L/M on v/m with estimate amount 349.38 per patient request. Left our phone number if he has any questions or needs to cancel.

## 2025-03-17 ENCOUNTER — APPOINTMENT (OUTPATIENT)
Dept: PHYSICAL THERAPY | Facility: HOSPITAL | Age: 35
End: 2025-03-17
Payer: COMMERCIAL

## 2025-05-12 ENCOUNTER — APPOINTMENT (OUTPATIENT)
Dept: OTOLARYNGOLOGY | Facility: CLINIC | Age: 35
End: 2025-05-12
Payer: COMMERCIAL

## 2025-07-25 ENCOUNTER — APPOINTMENT (OUTPATIENT)
Dept: PRIMARY CARE | Facility: CLINIC | Age: 35
End: 2025-07-25
Payer: COMMERCIAL

## 2025-07-25 NOTE — PROGRESS NOTES
Subjective   Reason for Visit: Russel Willis is an 34 y.o. male here for a CPE     No chief complaint on file.      HPI  Russel  is a 33 yo est male presenting today for Annual CPE   LOV: JUNE 2023     Current Providers  Specialists: I have reviewed specialist-related care of the patient in the medical record.   POD: [facility]  ENT: [facility]NOR    #CPE   Occupation:     Do you take any herbs or supplements that were not prescribed by a doctor?     Sexually active:  # Partners:  STI screening:     Fasting blood work:   HIV/HEP C Screening:   Last eye exam:  Last dental Exam:   Exercise:  Mood:  Sleep:   Vaccines:     Health Maintenance Due   Topic Date Due    MMR Vaccines (1 of 1 - Standard series) Never done    Varicella Vaccines (1 of 2 - 13+ 2-dose series) Never done    Hepatitis B Vaccines (1 of 3 - 19+ 3-dose series) Never done    HPV Vaccines (1 - 3-dose standard series) Never done    Yearly Adult Physical  12/23/2022    COVID-19 Vaccine (1 - 2024-25 season) Never done       RX Allergies[1]          Patient Care Team:  JESSICA Chapin as PCP - General (Family Medicine)     Review of Systems  ROS was completed and all systems are negative with the exception of what was noted in the the HPI.       Objective     Last Recorded Vitals:  There were no vitals taken for this visit.      Surgical History[2]    Current Outpatient Medications   Medication Instructions    fluticasone (Flonase) 50 mcg/actuation nasal spray 1 spray, Each Nostril, 2 times daily, Shake gently. Before first use, prime pump. After use, clean tip and replace cap.    loratadine (CLARITIN) 10 mg, oral, Daily PRN    montelukast (SINGULAIR) 10 mg, oral, Daily    pantoprazole (PROTONIX) 40 mg, oral, 2 times daily, Do not crush, chew, or split.         Physical Exam      Assessment & Plan                  [1] No Known Allergies  [2]   Past Surgical History:  Procedure Laterality Date    NO PAST SURGERIES        Tympanic membrane normal.      Left Ear: Tympanic membrane normal.      Mouth/Throat:      Mouth: Mucous membranes are moist.   Eyes:      Conjunctiva/sclera: Conjunctivae normal.   Cardiovascular:      Pulses: Normal pulses.      Heart sounds: Normal heart sounds.   Pulmonary:      Effort: Pulmonary effort is normal.      Breath sounds: Normal breath sounds.   Abdominal:      General: Bowel sounds are normal.   Musculoskeletal:         General: Normal range of motion.   Skin:     General: Skin is warm and dry.   Neurological:      Mental Status: He is alert and oriented to person, place, and time.   Psychiatric:         Mood and Affect: Mood normal.         Thought Content: Thought content normal.         Judgment: Judgment normal.       Assessment & Plan  Annual physical exam  - Counseled on healthy diet and regular exercise  - Fall avoidance information provided  - Personalized prevention plan provided   - Vaccines UTD   - FBW ordered     Orders:    CBC; Future    Comprehensive Metabolic Panel; Future    Hemoglobin A1C; Future    Lipid Panel; Future    Class 1 obesity due to excess calories without serious comorbidity with body mass index (BMI) of 33.0 to 33.9 in adult  In a face to face session, I informed patient of his/her BMI > 30.  We discussed appropriate nutrition choices and exercise plan to help achieve weight reduction.   Aim for 60 gm of Protein daily  Drink 60 oz of Water daily  Exercise 60 min EVERYDAY       Family history of thyroid disease  TSH ordered    Orders:    TSH with reflex to Free T4 if abnormal; Future    Family history of diabetes mellitus in father  A1C ordered                      [1] No Known Allergies  [2]   Past Surgical History:  Procedure Laterality Date    NO PAST SURGERIES

## 2025-08-19 ENCOUNTER — APPOINTMENT (OUTPATIENT)
Dept: PODIATRY | Facility: CLINIC | Age: 35
End: 2025-08-19
Payer: COMMERCIAL

## 2025-09-05 ENCOUNTER — APPOINTMENT (OUTPATIENT)
Dept: PRIMARY CARE | Facility: CLINIC | Age: 35
End: 2025-09-05
Payer: COMMERCIAL

## 2025-09-05 PROBLEM — Z83.49 FAMILY HISTORY OF THYROID DISEASE: Status: ACTIVE | Noted: 2025-09-05

## 2025-09-05 PROBLEM — E66.811 CLASS 1 OBESITY DUE TO EXCESS CALORIES WITHOUT SERIOUS COMORBIDITY WITH BODY MASS INDEX (BMI) OF 33.0 TO 33.9 IN ADULT: Status: ACTIVE | Noted: 2025-09-05

## 2025-09-05 PROBLEM — Z00.00 ANNUAL PHYSICAL EXAM: Status: ACTIVE | Noted: 2025-09-05

## 2025-09-05 PROBLEM — Z83.3 FAMILY HISTORY OF DIABETES MELLITUS IN FATHER: Status: ACTIVE | Noted: 2025-09-05

## 2025-09-05 PROBLEM — E66.09 CLASS 1 OBESITY DUE TO EXCESS CALORIES WITHOUT SERIOUS COMORBIDITY WITH BODY MASS INDEX (BMI) OF 33.0 TO 33.9 IN ADULT: Status: ACTIVE | Noted: 2025-09-05

## 2025-09-05 ASSESSMENT — PATIENT HEALTH QUESTIONNAIRE - PHQ9
2. FEELING DOWN, DEPRESSED OR HOPELESS: NOT AT ALL
SUM OF ALL RESPONSES TO PHQ9 QUESTIONS 1 AND 2: 0
1. LITTLE INTEREST OR PLEASURE IN DOING THINGS: NOT AT ALL

## 2025-09-05 NOTE — PATIENT INSTRUCTIONS
Thank you for seeing me today Russel Willis, it was a pleasure to see you again!    Great job with weight loss    We talked about the better fruits (lower sugar) for you to be eating    Lab work ordered today.  Please have your blood drawn in the next 1-2 weeks.  You need to be FASTING for 12 hours prior to blood draw.  You may only have water.  Please contact your insurance company to ask about the best location to get blood drawn.  We will contact you with the results of your blood work and any necessary adjustments  to your plan of care, if you do not hear from us within 3-5 days of having your blood drawn, please call the office at [phone].    For assistance with scheduling referrals or consultations, please call [phone] or [phone].    For laboratory, radiology, and other tests, please call [phone] ([phone] for pediatrics).   For laboratory locations, please visit [email or web address]   If you do not get results within 7-10 days, or you have any questions or concerns, please send a message, call the office ([phone]), or return to the office for a follow-up appointment.     For acute/sick visits, if you are unable to get an office visit, you can do a  On Demand Virtual Visit that is accessible via your My Chart account.  For emergencies, call 9-1-1 or go to the nearest Emergency Department.     Please schedule additional appointment(s) to address concern(s) not addressed today.    Please review prescription inserts and published information for possible adverse effects of all medications.     In general, results are discussed over the phone or via  LetMeHearYahart.     You can see your health information, review clinical summaries from office visits & test results online when you follow your health with MY  Chart, a personal health record.   To sign up go to [email or web address].   If you need assistance with signing up or trouble  getting into your account call [facility] Patient Line 24/7 at [phone]     RTC ANNUALLY AND AS NEEDED     ~Bettye Curry NP

## 2025-09-05 NOTE — ASSESSMENT & PLAN NOTE
- Counseled on healthy diet and regular exercise  - Fall avoidance information provided  - Personalized prevention plan provided   - Vaccines UTD   - FBW ordered     Orders:    CBC; Future    Comprehensive Metabolic Panel; Future    Hemoglobin A1C; Future    Lipid Panel; Future